# Patient Record
Sex: MALE | Race: WHITE | NOT HISPANIC OR LATINO | Employment: FULL TIME | URBAN - METROPOLITAN AREA
[De-identification: names, ages, dates, MRNs, and addresses within clinical notes are randomized per-mention and may not be internally consistent; named-entity substitution may affect disease eponyms.]

---

## 2017-10-15 ENCOUNTER — HOSPITAL ENCOUNTER (EMERGENCY)
Facility: HOSPITAL | Age: 57
Discharge: HOME | End: 2017-10-15
Attending: EMERGENCY MEDICINE | Admitting: EMERGENCY MEDICINE

## 2017-10-15 ENCOUNTER — APPOINTMENT (OUTPATIENT)
Dept: GENERAL RADIOLOGY | Facility: HOSPITAL | Age: 57
End: 2017-10-15

## 2017-10-15 VITALS
RESPIRATION RATE: 16 BRPM | OXYGEN SATURATION: 95 % | TEMPERATURE: 98.2 F | WEIGHT: 230 LBS | HEART RATE: 75 BPM | BODY MASS INDEX: 34.07 KG/M2 | SYSTOLIC BLOOD PRESSURE: 135 MMHG | DIASTOLIC BLOOD PRESSURE: 96 MMHG | HEIGHT: 69 IN

## 2017-10-15 DIAGNOSIS — S76.111A RUPTURE OF RIGHT QUADRICEPS MUSCLE, INITIAL ENCOUNTER: Primary | ICD-10-CM

## 2017-10-15 PROCEDURE — 99283 EMERGENCY DEPT VISIT LOW MDM: CPT

## 2017-10-15 PROCEDURE — 73560 X-RAY EXAM OF KNEE 1 OR 2: CPT

## 2017-10-15 RX ORDER — HYDROCODONE BITARTRATE AND ACETAMINOPHEN 7.5; 325 MG/1; MG/1
1 TABLET ORAL ONCE
Status: COMPLETED | OUTPATIENT
Start: 2017-10-15 | End: 2017-10-15

## 2017-10-15 RX ORDER — HYDROCODONE BITARTRATE AND ACETAMINOPHEN 7.5; 325 MG/1; MG/1
1 TABLET ORAL EVERY 6 HOURS PRN
Qty: 15 TABLET | Refills: 0 | Status: SHIPPED | OUTPATIENT
Start: 2017-10-15 | End: 2017-10-19 | Stop reason: SDUPTHER

## 2017-10-15 RX ADMIN — HYDROCODONE BITARTRATE AND ACETAMINOPHEN 1 TABLET: 7.5; 325 TABLET ORAL at 23:44

## 2017-10-16 ENCOUNTER — TELEPHONE (OUTPATIENT)
Dept: ORTHOPEDIC SURGERY | Facility: CLINIC | Age: 57
End: 2017-10-16

## 2017-10-16 NOTE — TELEPHONE ENCOUNTER
Seen at [facility] ER 10/15 and says has right quad tear. Told to f/u with WAN. He only has a ride on Tuesday or Thursday this week. Please advise.

## 2017-10-16 NOTE — DISCHARGE INSTRUCTIONS
Return to the ER with any further concerns or should your condition change or worsen.  Ice, elevate, crutches.

## 2017-10-16 NOTE — ED PROVIDER NOTES
The patient presents complaining of R thigh pain s/p injury with a refrigerator. Pt states that he was carrying a fridge up the stairs and that it fell onto his R thigh. Pt also c/o lowered ROM of his R leg, but states that he has been able to weight bear.     Physical Exam:   Back/extremities: soft tissue swelling in the suprapatellar area. Quadricepts tendon feels soft in this area. There is some weak extensor mechanism and no obvious defect.     Radiology:   XR R knee: soft tissue swelling, no fracture.     Plan:   Will d/c the pt with an immobilizer, meds for pain, and a f/u with an orthopedist. Advised ice for swelling.    I supervised care provided by the midlevel provider.  We have discussed this patient's history, physical exam, and treatment plan.  I have reviewed the note and personally saw and examined the patient and agree with the plan of care.    Documentation assistance provided by souleymane Alonso.  Information recorded by the scribe was done at my direction and has been verified and validated by me.           Fareed Alonso  10/15/17 7776       Mike Grover MD  10/16/17 1850

## 2017-10-16 NOTE — ED NOTES
"Pt reports he is unable to put weight on right leg. Pt states \"when I do my knee tries to give out me.\"     Belgica Villanueva RN  10/15/17 2123    "

## 2017-10-16 NOTE — ED PROVIDER NOTES
" EMERGENCY DEPARTMENT ENCOUNTER    CHIEF COMPLAINT  Chief Complaint: Right knee pain  History given by: Patient  History limited by: Nothing  Room Number: 03/03  PMD: No Known Provider      HPI:  Pt is a 57 y.o. male who presents complaining of pain in his right leg that began after a refrigerator fell on his right leg while he was trying to move it up stairs earlier today. He says that his right leg feels swollen and he cannot raise his leg. Any movement exacerbates his pain.     Duration:  Several hours  Onset: sudden  Timing: constant  Location: Right knee  Radiation: none  Quality: \"pain\"  Intensity/Severity: moderate  Progression: unchanged  Associated Symptoms: none  Aggravating Factors: none  Alleviating Factors: positional rest  Previous Episodes: none  Treatment before arrival: none    PAST MEDICAL HISTORY  Active Ambulatory Problems     Diagnosis Date Noted   • No Active Ambulatory Problems     Resolved Ambulatory Problems     Diagnosis Date Noted   • No Resolved Ambulatory Problems     No Additional Past Medical History       PAST SURGICAL HISTORY  History reviewed. No pertinent surgical history.    FAMILY HISTORY  History reviewed. No pertinent family history.    SOCIAL HISTORY  Social History     Social History   • Marital status:      Spouse name: N/A   • Number of children: N/A   • Years of education: N/A     Occupational History   • Not on file.     Social History Main Topics   • Smoking status: Not on file   • Smokeless tobacco: Not on file   • Alcohol use Not on file   • Drug use: Not on file   • Sexual activity: Not on file     Other Topics Concern   • Not on file     Social History Narrative   • No narrative on file       ALLERGIES  Review of patient's allergies indicates no known allergies.    REVIEW OF SYSTEMS  Review of Systems   Constitutional: Negative for activity change, appetite change and fever.   HENT: Negative for congestion and sore throat.    Eyes: Negative.    Respiratory: " Negative for cough and shortness of breath.    Cardiovascular: Negative for chest pain and leg swelling.   Gastrointestinal: Negative for abdominal pain, diarrhea and vomiting.   Endocrine: Negative.    Genitourinary: Negative for decreased urine volume and dysuria.   Musculoskeletal: Positive for myalgias (right knee). Negative for neck pain.   Skin: Negative for rash and wound.   Allergic/Immunologic: Negative.    Neurological: Negative for weakness, numbness and headaches.   Hematological: Negative.    Psychiatric/Behavioral: Negative.    All other systems reviewed and are negative.      PHYSICAL EXAM  ED Triage Vitals   Temp Heart Rate Resp BP SpO2   10/15/17 1803 10/15/17 1803 10/15/17 1803 10/15/17 1806 10/15/17 1803   98.2 °F (36.8 °C) 63 16 158/95 92 %      Temp src Heart Rate Source Patient Position BP Location FiO2 (%)   10/15/17 1803 -- -- -- --   Tympanic           Physical Exam   Constitutional: No distress.   HENT:   Head: Normocephalic and atraumatic.   Eyes: EOM are normal.   Neck: Normal range of motion.   Cardiovascular: Normal heart sounds.    Pulses:       Dorsalis pedis pulses are 2+ on the right side, and 2+ on the left side.   Pulmonary/Chest: No respiratory distress.   Abdominal: There is no tenderness.   Musculoskeletal: He exhibits no edema.   Soft tissue swelling, right knee. Pt unable to perform a SLR. There was an obvious deficit above the patella with his quadricep.    Neurological: He is alert.   Skin: Skin is warm and dry.   Nursing note and vitals reviewed.        RADIOLOGY  XR Knee 1 or 2 View Right   Preliminary Result   1.  No acute fracture or dislocation. Soft tissue swelling around the   knee.   2.  Calcifications above the knee joint may be soft tissue   calcifications or loose bodies.               I ordered the above noted radiological studies. Interpreted by radiologist.  Reviewed by me in PACS.       PROCEDURES  Procedures      PROGRESS AND CONSULTS  ED Course      1806  Ordered XR right knee for further evaluation.     2223  Informed Pt that his XR shows no acute fracture, but that he likely suffered a tear in his quadricep tendon. Informed Pt that I will get him pain medication and order a knee immobilizer.     2225  Placed call to Ortho for further evaluation. Ordered a knee immobilizer.     2240  Discussed case with Dr. Lantigua (Ortho) who suggests that Pt be discharged and instructed him to follow up with him in three days.     2305  Rechecked the patient and he is resting comfortably. Discussed plan to discharge the patient home and recommended follow up with Dr. Lantigua for an apt in three days. Instructed Pt to ice and elevate leg until then. Patient agrees with the plan and all questions were addressed.     2308  Ordered Norco for pain.     Latest vital signs   BP- 135/96 HR- 75 Temp- 98.2 °F (36.8 °C) (Tympanic) O2 sat- 95%    MEDICAL DECISION MAKING  Results were reviewed/discussed with the patient and they were also made aware of online access. Pt also made aware that some labs, such as cultures, will not be resulted during ER visit and follow up with PMD is necessary.     MDM  Number of Diagnoses or Management Options     Amount and/or Complexity of Data Reviewed  Tests in the radiology section of CPT®: ordered and reviewed (XR leg shows nothing acute. )  Discuss the patient with other providers: yes (Dr. Lantigua (Ortho))    Patient Progress  Patient progress: stable         DIAGNOSIS  Final diagnoses:   Rupture of right quadriceps muscle, initial encounter       DISPOSITION  DISCHARGE    Patient discharged in stable condition.    Reviewed implications of results, diagnosis, meds, responsibility to follow up, warning signs and symptoms of possible worsening, potential complications..    Patient/Family voiced understanding of above instructions.    Discussed plan for discharge, as there is no emergent indication for admission.  Pt/family is agreeable and  understands need for follow up and repeat testing.  Pt is aware that discharge does not mean that nothing is wrong but it indicates no emergency is present that requires admission and they must continue care with follow-up as given below or physician of their choice.     FOLLOW-UP  Terrence Lantigua MD  [facility] [id number] [phone]    Schedule an appointment as soon as possible for a visit  For further evaluation and treatment         Medication List      New Prescriptions          HYDROcodone-acetaminophen 7.5-325 MG per tablet   Commonly known as:  NORCO   Take 1 tablet by mouth Every 6 (Six) Hours As Needed for Moderate Pain .               Latest Documented Vital Signs:  As of 2:43 AM  BP- 135/96 HR- 75 Temp- 98.2 °F (36.8 °C) (Tympanic) O2 sat- 95%    --  Documentation assistance provided by souleymane Bergman for Scott Mckeon PA-C.  Information recorded by the scribe was done at my direction and has been verified and validated by me.     Gisela Bergman  10/15/17 6739       Xavier Longoria  10/16/17 0034       ZITA Stern III  10/16/17 0249

## 2017-10-19 ENCOUNTER — OFFICE VISIT (OUTPATIENT)
Dept: ORTHOPEDIC SURGERY | Facility: CLINIC | Age: 57
End: 2017-10-19

## 2017-10-19 ENCOUNTER — HOSPITAL ENCOUNTER (OUTPATIENT)
Dept: MRI IMAGING | Facility: HOSPITAL | Age: 57
Discharge: HOME | End: 2017-10-19
Attending: ORTHOPAEDIC SURGERY | Admitting: ORTHOPAEDIC SURGERY

## 2017-10-19 VITALS
TEMPERATURE: 97.6 F | DIASTOLIC BLOOD PRESSURE: 88 MMHG | HEIGHT: 69 IN | BODY MASS INDEX: 35.55 KG/M2 | SYSTOLIC BLOOD PRESSURE: 120 MMHG | WEIGHT: 240 LBS

## 2017-10-19 DIAGNOSIS — S76.111A RUPTURE QUADRICEPS TENDON, RIGHT, INITIAL ENCOUNTER: Primary | ICD-10-CM

## 2017-10-19 DIAGNOSIS — M23.91 INTERNAL DERANGEMENT OF KNEE JOINT, RIGHT: ICD-10-CM

## 2017-10-19 DIAGNOSIS — S76.111A RUPTURE QUADRICEPS TENDON, RIGHT, INITIAL ENCOUNTER: ICD-10-CM

## 2017-10-19 PROCEDURE — 73721 MRI JNT OF LWR EXTRE W/O DYE: CPT

## 2017-10-19 PROCEDURE — 99204 OFFICE O/P NEW MOD 45 MIN: CPT | Performed by: ORTHOPAEDIC SURGERY

## 2017-10-19 RX ORDER — CEFAZOLIN SODIUM 2 G/100ML
2 INJECTION, SOLUTION INTRAVENOUS ONCE
Status: CANCELLED | OUTPATIENT
Start: 2017-10-23 | End: 2017-10-19

## 2017-10-19 RX ORDER — HYDROCODONE BITARTRATE AND ACETAMINOPHEN 7.5; 325 MG/1; MG/1
TABLET ORAL
Qty: 60 TABLET | Refills: 0 | Status: SHIPPED | OUTPATIENT
Start: 2017-10-19 | End: 2017-11-02 | Stop reason: SDUPTHER

## 2017-10-19 RX ORDER — CEPHALEXIN 500 MG/1
CAPSULE ORAL
Qty: 4 CAPSULE | Refills: 0 | Status: SHIPPED | OUTPATIENT
Start: 2017-10-19 | End: 2017-12-04

## 2017-10-19 NOTE — PROGRESS NOTES
Patient:  Jose Manuel Vasquez is a 57 y.o. male    Chief Complaint/ Reason for Visit:    Chief Complaint   Patient presents with   • Right Knee - Establish Care, Pain, Joint Swelling       HPI:  This pleasant gentleman presents today, accompanied by his wife Kimberlee, requesting evaluation of a recent injury to his right knee and thigh.  He says that this past Sunday he was helping his son move a double wide risk for greater that was very heavy and it just started ranging and his left foot slipped.  The refrigerator, which was on a yahir, came down onto his right knee and thigh.  He had the immediate onset of severe sharp pain and swelling in his right knee with pain radiating up the lateral aspect of his right thigh.    He was evaluated in the emergency department at [facility].  I spoke with the emergency department provider that night who said that he felt a defect in his quadriceps tendon and it was fairly certain the patient had completely ruptured his right quadriceps tendon.  The patient was placed in a knee immobilizer and fitted with crutches.      The patient is now walking with one crutch and using the knee immobilizer.  He said that he doesn't have much pain when he is up moving around, but if he tries to bend or extend the knee has moderate pain of an aching nature.  He felt like he had a severe, crushing pain at the time of the injury.  Anything that involves walking or standing, especially standing in the shower without the brace, increases his pain.  He does not have any calf pain, chest pain, or shortness of breath.  He has no history of serious injury to his right knee previously at that he can recall.  Unfortunately, he was otherwise uninjured and this event.    PMH:    Past Medical History:   Diagnosis Date   • Fracture of wrist     Left       PSH:  History reviewed. No pertinent surgical history.    Social Hx:    Social History     Social History   • Marital status:      Spouse name: N/A   • Number of  children: N/A   • Years of education: N/A     Occupational History   • Not on file.     Social History Main Topics   • Smoking status: Never Smoker   • Smokeless tobacco: Never Used   • Alcohol use Yes   • Drug use: No   • Sexual activity: Defer     Other Topics Concern   • Not on file     Social History Narrative       Family Hx:    Family History   Problem Relation Age of Onset   • No Known Problems Mother    • No Known Problems Father    • No Known Problems Sister    • Heart disease Brother    • Diabetes Brother    • No Known Problems Maternal Aunt    • No Known Problems Maternal Uncle    • No Known Problems Paternal Aunt    • No Known Problems Paternal Uncle    • No Known Problems Maternal Grandmother    • No Known Problems Maternal Grandfather    • No Known Problems Paternal Grandmother    • No Known Problems Paternal Grandfather    • Anesthesia problems Neg Hx    • Broken bones Neg Hx    • Cancer Neg Hx    • Clotting disorder Neg Hx    • Collagen disease Neg Hx    • Dislocations Neg Hx    • Osteoporosis Neg Hx    • Rheumatologic disease Neg Hx    • Scoliosis Neg Hx    • Severe sprains Neg Hx        Meds:    Current Outpatient Prescriptions:   •  HYDROcodone-acetaminophen (NORCO) 7.5-325 MG per tablet, May take 1 or 2 tablets by mouth every 4 hours as needed for pain after surgery., Disp: 60 tablet, Rfl: 0  •  apixaban (ELIQUIS) 2.5 MG tablet tablet, Take 1 tablet by mouth every 12 hours beginning no sooner than 12 hours after the end of your surgery., Disp: 28 tablet, Rfl: 0  •  cephalexin (KEFLEX) 500 MG capsule, Take 1 tablet every 6 hours for only 4 doses after surgery., Disp: 4 capsule, Rfl: 0    Allergies:  No Known Allergies    ROS:  Review of Systems   Musculoskeletal: Positive for arthralgias, gait problem, joint swelling and myalgias.   All other systems reviewed and are negative.      Vitals:    10/19/17 0918   BP: 120/88   BP Location: Left arm   Patient Position: Sitting   Cuff Size: Adult   Temp:  "97.6 °F (36.4 °C)   TempSrc: Temporal Artery    Weight: 240 lb (109 kg)   Height: 69\" (175.3 cm)       Physical Exam   Constitutional: He is oriented to person, place, and time. He appears well-developed and well-nourished. He is cooperative.   HENT:   Head: Normocephalic and atraumatic.   Mouth/Throat: Oropharynx is clear and moist.   Eyes: Conjunctivae and EOM are normal. Pupils are equal, round, and reactive to light. No scleral icterus.   Neck: No JVD present. No tracheal deviation present.   Cardiovascular: Normal rate, regular rhythm, normal heart sounds and intact distal pulses.    No murmur heard.  Pulses:       Dorsalis pedis pulses are 2+ on the right side, and 2+ on the left side.        Posterior tibial pulses are 2+ on the right side, and 2+ on the left side.   Pulmonary/Chest: Effort normal and breath sounds normal. No respiratory distress.   Musculoskeletal:        Right knee: He exhibits decreased range of motion, swelling and effusion. He exhibits no laceration, no erythema and normal alignment.   Right knee: The right knee has moderate swelling and effusion, which I suspect is a hemarthrosis.  He has tenderness globally especially in the suprapatellar pouch and the suprapatellar region.  Interestingly enough, he can now do an active straight leg raise.  However, he does have discomfort against resisted extension of his right knee, and does definitely have a strength deficit to extension.  I do believe that I feel a defect in the suprapatellar region consistent with a near complete avulsion of his quadriceps tendon.  His right calf is soft and nontender with no venous cord.  I could not accurately assess stability due to the effusion, pain, and tenderness.   Neurological: He is alert and oriented to person, place, and time.   Skin: Skin is warm and dry. No rash noted. No cyanosis or erythema. Nails show no clubbing.   Psychiatric: He has a normal mood and affect. His speech is normal and behavior is " normal. Judgment and thought content normal.   Nursing note and vitals reviewed.              Radiology: X-rays: AP and lateral the patient's right knee were reviewed on the [facility] PACS system from his ER visit on Sunday.  The patient has significant soft tissue swelling and effusion.  He does not seem to have neftaly patella infra.  However, there is a calcific body in the suprapatellar effusion that I suspect may actually be an avulsed osteophyte that has traveled with much of his quadriceps tendon that I suspect to be avulsed.  Comparison images were not available.          Assessment:  1. Rupture quadriceps tendon, right, initial encounter    - MRI Knee Right Without Contrast; Future  - ceFAZolin (ANCEF) 2 g in sodium chloride 0.9 % 100 mL IVPB; Infuse 2 g into a venous catheter 1 (One) Time.  - Case Request; Standing  - Case Request    2. Internal derangement of knee joint, right    - MRI Knee Right Without Contrast; Future  - ceFAZolin (ANCEF) 2 g in sodium chloride 0.9 % 100 mL IVPB; Infuse 2 g into a venous catheter 1 (One) Time.  - Case Request; Standing  - Case Request        Plan:  I had a long discussion with the patient and his wife regarding his current circumstances.  I explained that I was surprisingly good do an active straight leg raise as his history knee exam certainly suggested a quadriceps tendon rupture.  It may be that he has sufficient strength in his tensor fascia a lot of that he is actually able to use this as an active extensor now that his acute pain has subsided.  I certainly do believe I feel a defect when I palpate the suprapatellar region.    That being said, I think we need an MRI to define exactly what is going on.  I will be surprised if he does not have a near complete quadriceps tendon rupture.  We also need to see what else may be going on inside his knee.    I suspect he has a surgical injury and therefore I think we should go ahead and schedule him for quadriceps tendon  repair in anticipation of this finding.  Of course, if this is not found to be the case, we can always cancel the surgery.  The patient and his wife are in agreement.     I discussed everything with the patient and his wife.  We reviewed the options, both surgical and nonsurgical, the potential outcomes good and bad of these options, and the pluses and minuses, risks and benefits thereof.  I reviewed that surgery had risks, and that surgery had no guarantees.  I advised the patient that nonsurgical management of this injury may not result in a good outcome.  I explained the elements of the injury that made me concerned that nonsurgical management was probably not the best option, though again, I emphasized that there was no guarantee of a good outcome with surgical treatment.  We reviewed that the risks of surgery include but are not limited to bleeding, infection, injury to nerves, blood vessels, tendons, ligaments, or other soft tissue structures, skin wound problems or skin breakdown, permanent pain, permanent stiffness, permanent limping, and/or permanent swelling, the need for future surgical procedures, blood clots, arthritis, pulmonary embolism, pneumonia, stroke, heart attack, amputation, and even death due to these or other complications.  I advised them that the patient would likely never regain his full, preinjury range of motion, and that he may always have some degree of discomfort, pain, and/or stiffness.  He may have some degree of permanent limping and/or swelling.  They said they understand.    We discussed the particulars of surgical treatment of this problem and the various options in that respect.  I went over the details of transosseous tunnel technique for repair of the quadriceps tendon.  We also reviewed, in detail, the expected postoperative activity recommendations, restrictions, and requirements, and reviewed the recovery from the standpoint restrictions and expectations.  All of their  questions were answered to their satisfaction.  The patient said he said he understood the options and agrees with proceeding with surgery scheduling understanding that if his MRI does not reveal what clinically I believed to be a quadriceps tendon rupture that is near-complete that we can always cancel the surgery.    Narcotic counseling was performed in the usual fashion, and I emphasized the risks of narcotic use and the dangers. We discussed the potential for dependency and addiction, and we discussed things to avoid when taking these medications including specific activities to avoid and other sedating substances including alcohol or other medications.     A [facility] report was checked, and the patient confirmed that she had reviewed the house bill 1 warnings on the exam all. Appropriate narcotic pain medication was issued. The patient was also prescribed an appropriate prophylactic postoperative antibiotic.        Orders Placed This Encounter   Procedures   • MRI Knee Right Without Contrast     Standing Status:   Future     Standing Expiration Date:   10/19/2018     Order Specific Question:   Reason for Exam:     Answer:   I suspect this patient has a high-grade partial/near-complete rupture of his right quadriceps tendon.  I need to evaluate his injury, and also evaluate the right knee for associated surgical internal derangements.   • Follow anesthesia standing orders.   • Obtain informed consent     Order Specific Question:   Informed Consent Given For     Answer:   Quadriceps tendon repair     Order Specific Question:   Laterality     Answer:   Right   • Clorhexidine skin prep     Educate and review protocol with patient.   • Provide instructions to patient regarding NPO status

## 2017-10-20 ENCOUNTER — APPOINTMENT (OUTPATIENT)
Dept: PREADMISSION TESTING | Facility: HOSPITAL | Age: 57
End: 2017-10-20

## 2017-10-20 ENCOUNTER — TELEPHONE (OUTPATIENT)
Dept: ORTHOPEDIC SURGERY | Facility: CLINIC | Age: 57
End: 2017-10-20

## 2017-10-20 VITALS
DIASTOLIC BLOOD PRESSURE: 93 MMHG | WEIGHT: 230 LBS | SYSTOLIC BLOOD PRESSURE: 170 MMHG | HEIGHT: 69 IN | TEMPERATURE: 98.3 F | RESPIRATION RATE: 20 BRPM | BODY MASS INDEX: 34.07 KG/M2 | HEART RATE: 62 BPM | OXYGEN SATURATION: 100 %

## 2017-10-20 LAB
DEPRECATED RDW RBC AUTO: 43.1 FL (ref 37–54)
ERYTHROCYTE [DISTWIDTH] IN BLOOD BY AUTOMATED COUNT: 12.4 % (ref 11.5–14.5)
HCT VFR BLD AUTO: 43 % (ref 40.4–52.2)
HGB BLD-MCNC: 14.6 G/DL (ref 13.7–17.6)
MCH RBC QN AUTO: 32.2 PG (ref 27–32.7)
MCHC RBC AUTO-ENTMCNC: 34 G/DL (ref 32.6–36.4)
MCV RBC AUTO: 94.7 FL (ref 79.8–96.2)
PLATELET # BLD AUTO: 261 10*3/MM3 (ref 140–500)
PMV BLD AUTO: 10.5 FL (ref 6–12)
RBC # BLD AUTO: 4.54 10*6/MM3 (ref 4.6–6)
WBC NRBC COR # BLD: 8.13 10*3/MM3 (ref 4.5–10.7)

## 2017-10-20 PROCEDURE — 36415 COLL VENOUS BLD VENIPUNCTURE: CPT

## 2017-10-20 PROCEDURE — 85027 COMPLETE CBC AUTOMATED: CPT | Performed by: ORTHOPAEDIC SURGERY

## 2017-10-20 NOTE — DISCHARGE INSTRUCTIONS
Take the following medications the morning of surgery with a small sip of water:  NONE    ARRIVAL TIME 1030 TO [facility]        General Instructions:  • Do not eat solid food after midnight the night before surgery.  • You may drink clear liquids day of surgery but must stop at least one hour before your hospital arrival time.  • It is beneficial for you to have a clear drink that contains carbohydrates the day of surgery.  We suggest a 12 to 20 ounce bottle of Gatorade or Powerade for non-diabetic patients or a 12 to 20 ounce bottle of G2 or Powerade Zero for diabetic patients. (Pediatric patients, are not advised to drink a 12 to 20 ounce carbohydrate drink)    Clear liquids are liquids you can see through.  Nothing red in color.     Plain water                               Sports drinks  Sodas                                   Gelatin (Jell-O)  Fruit juices without pulp such as white grape juice and apple juice  Popsicles that contain no fruit or yogurt  Tea or coffee (no cream or milk added)  Gatorade / Powerade  G2 / Powerade Zero    • Infants may have breast milk up to four hours before surgery.  • Infants drinking formula may drink formula up to six hours before surgery.   • Patients who avoid smoking, chewing tobacco and alcohol for 4 weeks prior to surgery have a reduced risk of post-operative complications.  Quit smoking as many days before surgery as you can.  • Do not smoke, use chewing tobacco or drink alcohol the day of surgery.   • If applicable bring your C-PAP/ BI-PAP machine.  • Bring any papers given to you in the doctor’s office.  • Wear clean comfortable clothes and socks.  • Do not wear contact lenses or make-up.  Bring a case for your glasses.   • Bring crutches or walker if applicable.  • Remove all piercings.  Leave jewelry and any other valuables at home.  • The Pre-Admission Testing nurse will instruct you to bring medications if unable to obtain an accurate list in  Pre-Admission Testing.            Preventing a Surgical Site Infection:  • For 2 to 3 days before surgery, avoid shaving with a razor because the razor can irritate skin and make it easier to develop an infection.  • The night prior to surgery sleep in a clean bed with clean clothing.  Do not allow pets to sleep with you.  • Shower on the morning of surgery using a fresh bar of anti-bacterial soap (such as Dial) and clean washcloth.  Dry with a clean towel and dress in clean clothing.  • Ask your surgeon if you will be receiving antibiotics prior to surgery.  • Make sure you, your family, and all healthcare providers clean their hands with soap and water or an alcohol based hand  before caring for you or your wound.    Day of surgery:  Upon arrival, a Pre-op nurse and Anesthesiologist will review your health history, obtain vital signs, and answer questions you may have.  The only belongings needed at this time will be your home medications and if applicable your C-PAP/BI-PAP machine.  If you are staying overnight your family can leave the rest of your belongings in the car and bring them to your room later.  A Pre-op nurse will start an IV and you may receive medication in preparation for surgery, including something to help you relax.  Your family will be able to see you in the Pre-op area.  While you are in surgery your family should notify the waiting room  if they leave the waiting room area and provide a contact phone number.    Please be aware that surgery does come with discomfort.  We want to make every effort to control your discomfort so please discuss any uncontrolled symptoms with your nurse.   Your doctor will most likely have prescribed pain medications.      If you are going home after surgery you will receive individualized written care instructions before being discharged.  A responsible adult must drive you to and from the hospital on the day of your surgery and stay with you  for 24 hours.    If you are staying overnight following surgery, you will be transported to your hospital room following the recovery period.  [facility] has all private rooms.    If you have any questions please call Pre-Admission Testing at [phone].  Deductibles and co-payments are collected on the day of service. Please be prepared to pay the required co-pay, deductible or deposit on the day of service as defined by your plan.

## 2017-10-23 ENCOUNTER — TELEPHONE (OUTPATIENT)
Dept: ORTHOPEDIC SURGERY | Facility: CLINIC | Age: 57
End: 2017-10-23

## 2017-10-23 ENCOUNTER — HOSPITAL ENCOUNTER (OUTPATIENT)
Facility: HOSPITAL | Age: 57
Setting detail: HOSPITAL OUTPATIENT SURGERY
Discharge: HOME | End: 2017-10-23
Attending: ORTHOPAEDIC SURGERY | Admitting: ORTHOPAEDIC SURGERY

## 2017-10-23 ENCOUNTER — ANESTHESIA EVENT (OUTPATIENT)
Dept: PERIOP | Facility: HOSPITAL | Age: 57
End: 2017-10-23

## 2017-10-23 ENCOUNTER — ANESTHESIA (OUTPATIENT)
Dept: PERIOP | Facility: HOSPITAL | Age: 57
End: 2017-10-23

## 2017-10-23 VITALS
DIASTOLIC BLOOD PRESSURE: 91 MMHG | OXYGEN SATURATION: 95 % | SYSTOLIC BLOOD PRESSURE: 132 MMHG | RESPIRATION RATE: 16 BRPM | HEART RATE: 90 BPM | TEMPERATURE: 97.7 F

## 2017-10-23 DIAGNOSIS — S76.111A RUPTURE QUADRICEPS TENDON, RIGHT, INITIAL ENCOUNTER: ICD-10-CM

## 2017-10-23 DIAGNOSIS — M23.91 INTERNAL DERANGEMENT OF KNEE JOINT, RIGHT: ICD-10-CM

## 2017-10-23 PROBLEM — S83.241A TEAR OF MEDIAL MENISCUS OF RIGHT KNEE, CURRENT: Status: ACTIVE | Noted: 2017-10-23

## 2017-10-23 PROCEDURE — C1713 ANCHOR/SCREW BN/BN,TIS/BN: HCPCS | Performed by: ORTHOPAEDIC SURGERY

## 2017-10-23 PROCEDURE — 25010000002 DEXAMETHASONE PER 1 MG: Performed by: NURSE ANESTHETIST, CERTIFIED REGISTERED

## 2017-10-23 PROCEDURE — 25010000002 FENTANYL CITRATE (PF) 100 MCG/2ML SOLUTION: Performed by: ANESTHESIOLOGY

## 2017-10-23 PROCEDURE — 25010000002 ONDANSETRON PER 1 MG: Performed by: NURSE ANESTHETIST, CERTIFIED REGISTERED

## 2017-10-23 PROCEDURE — 25010000002 KETOROLAC TROMETHAMINE PER 15 MG: Performed by: NURSE ANESTHETIST, CERTIFIED REGISTERED

## 2017-10-23 PROCEDURE — 25010000002 PROPOFOL 10 MG/ML EMULSION: Performed by: NURSE ANESTHETIST, CERTIFIED REGISTERED

## 2017-10-23 PROCEDURE — 25010000002 FENTANYL CITRATE (PF) 100 MCG/2ML SOLUTION: Performed by: NURSE ANESTHETIST, CERTIFIED REGISTERED

## 2017-10-23 PROCEDURE — 25010000002 MIDAZOLAM PER 1 MG: Performed by: ANESTHESIOLOGY

## 2017-10-23 PROCEDURE — 25010000002 HYDROMORPHONE PER 4 MG: Performed by: NURSE ANESTHETIST, CERTIFIED REGISTERED

## 2017-10-23 PROCEDURE — 27385 REPAIR OF THIGH MUSCLE: CPT | Performed by: ORTHOPAEDIC SURGERY

## 2017-10-23 PROCEDURE — 25010000003 CEFAZOLIN IN DEXTROSE 2-4 GM/100ML-% SOLUTION: Performed by: ORTHOPAEDIC SURGERY

## 2017-10-23 PROCEDURE — 25010000002 HYDROMORPHONE PER 4 MG

## 2017-10-23 PROCEDURE — 25010000002 ROPIVACAINE PER 1 MG: Performed by: ANESTHESIOLOGY

## 2017-10-23 RX ORDER — HYDRALAZINE HYDROCHLORIDE 20 MG/ML
5 INJECTION INTRAMUSCULAR; INTRAVENOUS
Status: DISCONTINUED | OUTPATIENT
Start: 2017-10-23 | End: 2017-10-23 | Stop reason: HOSPADM

## 2017-10-23 RX ORDER — PROPOFOL 10 MG/ML
VIAL (ML) INTRAVENOUS AS NEEDED
Status: DISCONTINUED | OUTPATIENT
Start: 2017-10-23 | End: 2017-10-23 | Stop reason: SURG

## 2017-10-23 RX ORDER — LABETALOL HYDROCHLORIDE 5 MG/ML
INJECTION, SOLUTION INTRAVENOUS AS NEEDED
Status: DISCONTINUED | OUTPATIENT
Start: 2017-10-23 | End: 2017-10-23 | Stop reason: SURG

## 2017-10-23 RX ORDER — FENTANYL CITRATE 50 UG/ML
50 INJECTION, SOLUTION INTRAMUSCULAR; INTRAVENOUS
Status: DISCONTINUED | OUTPATIENT
Start: 2017-10-23 | End: 2017-10-23 | Stop reason: HOSPADM

## 2017-10-23 RX ORDER — KETOROLAC TROMETHAMINE 30 MG/ML
INJECTION, SOLUTION INTRAMUSCULAR; INTRAVENOUS AS NEEDED
Status: DISCONTINUED | OUTPATIENT
Start: 2017-10-23 | End: 2017-10-23 | Stop reason: SURG

## 2017-10-23 RX ORDER — MIDAZOLAM HYDROCHLORIDE 1 MG/ML
2 INJECTION INTRAMUSCULAR; INTRAVENOUS
Status: DISCONTINUED | OUTPATIENT
Start: 2017-10-23 | End: 2017-10-23 | Stop reason: HOSPADM

## 2017-10-23 RX ORDER — ACETAMINOPHEN 650 MG
TABLET, EXTENDED RELEASE ORAL AS NEEDED
Status: DISCONTINUED | OUTPATIENT
Start: 2017-10-23 | End: 2017-10-23 | Stop reason: HOSPADM

## 2017-10-23 RX ORDER — MIDAZOLAM HYDROCHLORIDE 1 MG/ML
1 INJECTION INTRAMUSCULAR; INTRAVENOUS
Status: DISCONTINUED | OUTPATIENT
Start: 2017-10-23 | End: 2017-10-23 | Stop reason: HOSPADM

## 2017-10-23 RX ORDER — ACETAMINOPHEN 325 MG/1
650 TABLET ORAL ONCE AS NEEDED
Status: DISCONTINUED | OUTPATIENT
Start: 2017-10-23 | End: 2017-10-23 | Stop reason: HOSPADM

## 2017-10-23 RX ORDER — SODIUM CHLORIDE, SODIUM LACTATE, POTASSIUM CHLORIDE, CALCIUM CHLORIDE 600; 310; 30; 20 MG/100ML; MG/100ML; MG/100ML; MG/100ML
9 INJECTION, SOLUTION INTRAVENOUS CONTINUOUS
Status: DISCONTINUED | OUTPATIENT
Start: 2017-10-23 | End: 2017-10-23 | Stop reason: HOSPADM

## 2017-10-23 RX ORDER — PROMETHAZINE HYDROCHLORIDE 25 MG/1
25 TABLET ORAL ONCE AS NEEDED
Status: DISCONTINUED | OUTPATIENT
Start: 2017-10-23 | End: 2017-10-23 | Stop reason: HOSPADM

## 2017-10-23 RX ORDER — ACETAMINOPHEN 325 MG/1
650 TABLET ORAL ONCE
Status: COMPLETED | OUTPATIENT
Start: 2017-10-23 | End: 2017-10-23

## 2017-10-23 RX ORDER — PROMETHAZINE HYDROCHLORIDE 25 MG/1
25 SUPPOSITORY RECTAL ONCE AS NEEDED
Status: DISCONTINUED | OUTPATIENT
Start: 2017-10-23 | End: 2017-10-23 | Stop reason: HOSPADM

## 2017-10-23 RX ORDER — OXYCODONE HYDROCHLORIDE AND ACETAMINOPHEN 5; 325 MG/1; MG/1
TABLET ORAL
Status: COMPLETED
Start: 2017-10-23 | End: 2017-10-23

## 2017-10-23 RX ORDER — OXYCODONE HYDROCHLORIDE AND ACETAMINOPHEN 5; 325 MG/1; MG/1
1 TABLET ORAL ONCE AS NEEDED
Status: DISCONTINUED | OUTPATIENT
Start: 2017-10-23 | End: 2017-10-23 | Stop reason: HOSPADM

## 2017-10-23 RX ORDER — LIDOCAINE HYDROCHLORIDE 20 MG/ML
INJECTION, SOLUTION INFILTRATION; PERINEURAL AS NEEDED
Status: DISCONTINUED | OUTPATIENT
Start: 2017-10-23 | End: 2017-10-23 | Stop reason: SURG

## 2017-10-23 RX ORDER — HYDROCODONE BITARTRATE AND ACETAMINOPHEN 7.5; 325 MG/1; MG/1
1 TABLET ORAL ONCE AS NEEDED
Status: CANCELLED | OUTPATIENT
Start: 2017-10-23 | End: 2017-11-02

## 2017-10-23 RX ORDER — PROMETHAZINE HYDROCHLORIDE 25 MG/ML
6.25 INJECTION, SOLUTION INTRAMUSCULAR; INTRAVENOUS ONCE AS NEEDED
Status: DISCONTINUED | OUTPATIENT
Start: 2017-10-23 | End: 2017-10-23 | Stop reason: HOSPADM

## 2017-10-23 RX ORDER — NALOXONE HCL 0.4 MG/ML
0.4 VIAL (ML) INJECTION AS NEEDED
Status: DISCONTINUED | OUTPATIENT
Start: 2017-10-23 | End: 2017-10-23 | Stop reason: HOSPADM

## 2017-10-23 RX ORDER — NALBUPHINE HCL 10 MG/ML
2 AMPUL (ML) INJECTION EVERY 4 HOURS PRN
Status: DISCONTINUED | OUTPATIENT
Start: 2017-10-23 | End: 2017-10-23 | Stop reason: HOSPADM

## 2017-10-23 RX ORDER — FENTANYL CITRATE 50 UG/ML
INJECTION, SOLUTION INTRAMUSCULAR; INTRAVENOUS AS NEEDED
Status: DISCONTINUED | OUTPATIENT
Start: 2017-10-23 | End: 2017-10-23 | Stop reason: SURG

## 2017-10-23 RX ORDER — SODIUM CHLORIDE 0.9 % (FLUSH) 0.9 %
1-10 SYRINGE (ML) INJECTION AS NEEDED
Status: DISCONTINUED | OUTPATIENT
Start: 2017-10-23 | End: 2017-10-23 | Stop reason: HOSPADM

## 2017-10-23 RX ORDER — DIPHENHYDRAMINE HYDROCHLORIDE 50 MG/ML
12.5 INJECTION INTRAMUSCULAR; INTRAVENOUS
Status: DISCONTINUED | OUTPATIENT
Start: 2017-10-23 | End: 2017-10-23 | Stop reason: HOSPADM

## 2017-10-23 RX ORDER — NALBUPHINE HCL 10 MG/ML
10 AMPUL (ML) INJECTION EVERY 4 HOURS PRN
Status: DISCONTINUED | OUTPATIENT
Start: 2017-10-23 | End: 2017-10-23 | Stop reason: HOSPADM

## 2017-10-23 RX ORDER — ACETAMINOPHEN 650 MG/1
650 SUPPOSITORY RECTAL ONCE AS NEEDED
Status: DISCONTINUED | OUTPATIENT
Start: 2017-10-23 | End: 2017-10-23 | Stop reason: HOSPADM

## 2017-10-23 RX ORDER — ONDANSETRON 2 MG/ML
INJECTION INTRAMUSCULAR; INTRAVENOUS AS NEEDED
Status: DISCONTINUED | OUTPATIENT
Start: 2017-10-23 | End: 2017-10-23 | Stop reason: SURG

## 2017-10-23 RX ORDER — LIDOCAINE HYDROCHLORIDE 10 MG/ML
0.5 INJECTION, SOLUTION EPIDURAL; INFILTRATION; INTRACAUDAL; PERINEURAL ONCE AS NEEDED
Status: DISCONTINUED | OUTPATIENT
Start: 2017-10-23 | End: 2017-10-23 | Stop reason: HOSPADM

## 2017-10-23 RX ORDER — ROPIVACAINE HYDROCHLORIDE 5 MG/ML
INJECTION, SOLUTION EPIDURAL; INFILTRATION; PERINEURAL AS NEEDED
Status: DISCONTINUED | OUTPATIENT
Start: 2017-10-23 | End: 2017-10-23 | Stop reason: SURG

## 2017-10-23 RX ORDER — HYDROMORPHONE HYDROCHLORIDE 1 MG/ML
0.5 INJECTION, SOLUTION INTRAMUSCULAR; INTRAVENOUS; SUBCUTANEOUS
Status: DISCONTINUED | OUTPATIENT
Start: 2017-10-23 | End: 2017-10-23 | Stop reason: HOSPADM

## 2017-10-23 RX ORDER — HYDROMORPHONE HCL 110MG/55ML
PATIENT CONTROLLED ANALGESIA SYRINGE INTRAVENOUS AS NEEDED
Status: DISCONTINUED | OUTPATIENT
Start: 2017-10-23 | End: 2017-10-23 | Stop reason: SURG

## 2017-10-23 RX ORDER — LABETALOL HYDROCHLORIDE 5 MG/ML
INJECTION, SOLUTION INTRAVENOUS AS NEEDED
Status: DISCONTINUED | OUTPATIENT
Start: 2017-10-23 | End: 2017-10-23

## 2017-10-23 RX ORDER — FAMOTIDINE 10 MG/ML
20 INJECTION, SOLUTION INTRAVENOUS ONCE
Status: COMPLETED | OUTPATIENT
Start: 2017-10-23 | End: 2017-10-23

## 2017-10-23 RX ORDER — CEFAZOLIN SODIUM 2 G/100ML
2 INJECTION, SOLUTION INTRAVENOUS ONCE
Status: COMPLETED | OUTPATIENT
Start: 2017-10-23 | End: 2017-10-23

## 2017-10-23 RX ORDER — DEXAMETHASONE SODIUM PHOSPHATE 10 MG/ML
INJECTION INTRAMUSCULAR; INTRAVENOUS AS NEEDED
Status: DISCONTINUED | OUTPATIENT
Start: 2017-10-23 | End: 2017-10-23 | Stop reason: SURG

## 2017-10-23 RX ADMIN — OXYCODONE HYDROCHLORIDE AND ACETAMINOPHEN 1 TABLET: 5; 325 TABLET ORAL at 17:04

## 2017-10-23 RX ADMIN — FENTANYL CITRATE 100 MCG: 50 INJECTION INTRAMUSCULAR; INTRAVENOUS at 11:29

## 2017-10-23 RX ADMIN — SODIUM CHLORIDE, POTASSIUM CHLORIDE, SODIUM LACTATE AND CALCIUM CHLORIDE 9 ML/HR: 600; 310; 30; 20 INJECTION, SOLUTION INTRAVENOUS at 11:29

## 2017-10-23 RX ADMIN — SODIUM CHLORIDE, POTASSIUM CHLORIDE, SODIUM LACTATE AND CALCIUM CHLORIDE 9 ML/HR: 600; 310; 30; 20 INJECTION, SOLUTION INTRAVENOUS at 11:23

## 2017-10-23 RX ADMIN — FENTANYL CITRATE 50 MCG: 50 INJECTION INTRAMUSCULAR; INTRAVENOUS at 14:59

## 2017-10-23 RX ADMIN — ONDANSETRON 4 MG: 2 INJECTION INTRAMUSCULAR; INTRAVENOUS at 15:30

## 2017-10-23 RX ADMIN — MIDAZOLAM 2 MG: 1 INJECTION INTRAMUSCULAR; INTRAVENOUS at 11:30

## 2017-10-23 RX ADMIN — LIDOCAINE HYDROCHLORIDE 100 MG: 20 INJECTION, SOLUTION INFILTRATION; PERINEURAL at 13:26

## 2017-10-23 RX ADMIN — DEXAMETHASONE SODIUM PHOSPHATE 8 MG: 10 INJECTION INTRAMUSCULAR; INTRAVENOUS at 13:45

## 2017-10-23 RX ADMIN — CEFAZOLIN SODIUM 2 G: 2 INJECTION, SOLUTION INTRAVENOUS at 13:28

## 2017-10-23 RX ADMIN — FENTANYL CITRATE 50 MCG: 50 INJECTION INTRAMUSCULAR; INTRAVENOUS at 14:33

## 2017-10-23 RX ADMIN — ACETAMINOPHEN 650 MG: 325 TABLET ORAL at 11:47

## 2017-10-23 RX ADMIN — ROPIVACAINE HYDROCHLORIDE 20 ML: 5 INJECTION, SOLUTION EPIDURAL; INFILTRATION; PERINEURAL at 11:43

## 2017-10-23 RX ADMIN — HYDROMORPHONE HYDROCHLORIDE 0.5 MG: 1 INJECTION, SOLUTION INTRAMUSCULAR; INTRAVENOUS; SUBCUTANEOUS at 16:42

## 2017-10-23 RX ADMIN — KETOROLAC TROMETHAMINE 30 MG: 30 INJECTION, SOLUTION INTRAMUSCULAR; INTRAVENOUS at 15:53

## 2017-10-23 RX ADMIN — SODIUM CHLORIDE, POTASSIUM CHLORIDE, SODIUM LACTATE AND CALCIUM CHLORIDE: 600; 310; 30; 20 INJECTION, SOLUTION INTRAVENOUS at 15:05

## 2017-10-23 RX ADMIN — FAMOTIDINE 20 MG: 10 INJECTION, SOLUTION INTRAVENOUS at 11:29

## 2017-10-23 RX ADMIN — LABETALOL HYDROCHLORIDE 5 MG: 5 INJECTION, SOLUTION INTRAVENOUS at 15:10

## 2017-10-23 RX ADMIN — HYDROMORPHONE HYDROCHLORIDE 0.5 MG: 2 INJECTION, SOLUTION INTRAMUSCULAR; INTRAVENOUS; SUBCUTANEOUS at 15:26

## 2017-10-23 RX ADMIN — PROPOFOL 300 MG: 10 INJECTION, EMULSION INTRAVENOUS at 13:26

## 2017-10-23 NOTE — BRIEF OP NOTE
QUADRICEPS TENDON REPAIR  Progress Note    Jose Manuel Vasquez  10/23/2017    Pre-op Diagnosis:   Rupture quadriceps tendon, right, initial encounter [S76.111A]  Internal derangement of knee joint, right [M23.91]       Post-Op Diagnosis Codes:     * Rupture quadriceps tendon, right, initial encounter [S76.111A]     * Internal derangement of knee joint, right [M23.91]    Procedure/CPT® Codes:      Procedure(s):  RIGHT QUADRICEPS TENDON REPAIR    Surgeon(s):  Terrence Lantigua MD    Anesthesia: General with Block    Staff:   Cell Saver : Kelly Caamcho  Circulator: Abbie Vazquez RN; Brook Aguilar RN; Mounika Galo RN; Rom Raymond RN  Scrub Person: Netta Grant; Shaji Heard    Estimated Blood Loss: 60 mL    Urine Voided: * No values recorded between 10/23/2017  1:19 PM and 10/23/2017  4:22 PM *    Specimens:                None      Drains: none          Findings: The most superficial/anterior aspect of the quadriceps tendon remained intact and attached to the anterior aspect of the patella superiorly.  The vast majority of the tendon, however, was completely avulsed from the patella.    Complications: None noted intraoperatively    Tourniquet time:  90 minutes      Terrence Lantigua MD     Date: 10/23/2017  Time: 4:50 PM

## 2017-10-23 NOTE — ANESTHESIA PREPROCEDURE EVALUATION
Anesthesia Evaluation     no history of anesthetic complications:  NPO Solid Status: > 8 hours       Airway   Mallampati: II  TM distance: >3 FB  Neck ROM: full  no difficulty expected  Dental - normal exam     Pulmonary - negative pulmonary ROS and normal exam   Cardiovascular   Exercise tolerance: good (4-7 METS)    Rhythm: regular    (+) murmur,   (-) carotid bruits    PE comment: 1/6 JELLY  RUSB    Neuro/Psych- negative ROS  GI/Hepatic/Renal/Endo - negative ROS     Musculoskeletal (-) negative ROS    Abdominal  - normal exam   Substance History      OB/GYN          Other                                        Anesthesia Plan    ASA 2     general and regional   (  D/W R&B of GA including but not limited to: heart, lung, liver, kidney, neurologic problems, positioning injuries, dental damage, corneal abrasion and TMJ.  .    + FNB)  intravenous induction   Anesthetic plan and risks discussed with patient.

## 2017-10-23 NOTE — ANESTHESIA POSTPROCEDURE EVALUATION
Patient: Jose Manuel Vasquez    Procedure Summary     Date Anesthesia Start Anesthesia Stop Room / Location    10/23/17 1879 1626  [facility]  /  [facility]       Procedure Diagnosis Surgeon Provider    QUADRICEPS TENDON REPAIR (Right Thigh) Rupture quadriceps tendon, right, initial encounter; Internal derangement of knee joint, right  (Rupture quadriceps tendon, right, initial encounter [S76.111A]; Internal derangement of knee joint, right [M23.91]) Terrence Lantigua MD Rodrigo Ethan Slade MD          Anesthesia Type: general, regional  Last vitals  BP   138/91 (10/23/17 1715)   Temp   36.5 °C (97.7 °F) (10/23/17 1715)   Pulse   92 (10/23/17 1715)   Resp   16 (10/23/17 1715)     SpO2   97 % (10/23/17 1715)     Post Anesthesia Care and Evaluation    Patient location during evaluation: bedside  Pain management: adequate  Airway patency: patent  Anesthetic complications: No anesthetic complications    Cardiovascular status: acceptable  Respiratory status: acceptable  Hydration status: acceptable

## 2017-10-23 NOTE — PLAN OF CARE
Problem: Patient Care Overview (Adult)  Goal: Plan of Care Review  Outcome: Outcome(s) achieved Date Met:  10/23/17    10/23/17 1743   Coping/Psychosocial Response Interventions   Plan Of Care Reviewed With patient;spouse;daughter   Patient Care Overview   Progress improving   Outcome Evaluation   Outcome Summary/Follow up Plan TOLERATING ORAL INTAKE. EDUCATION COMPLETED.        Goal: Adult Individualization and Mutuality  Outcome: Outcome(s) achieved Date Met:  10/23/17    10/23/17 1743   Individualization   Patient Specific Interventions PREOP NERVE BLOCK. POST OP MEDS AS NOTED. DENIES PAIN, JUST STIFFNESS       Goal: Discharge Needs Assessment  Outcome: Outcome(s) achieved Date Met:  10/23/17    10/23/17 1743   Discharge Needs Assessment   Concerns To Be Addressed no discharge needs identified   Equipment Needed After Discharge crutches

## 2017-10-23 NOTE — INTERVAL H&P NOTE
H&P reviewed. The patient was examined and there are no changes to the H&P.     Terrence Lantigua MD  10/23/17

## 2017-10-23 NOTE — ANESTHESIA PROCEDURE NOTES
Peripheral Block    Patient location during procedure: pre-op  Start time: 10/23/2017 11:34 AM  Stop time: 10/23/2017 11:43 AM  Reason for block: at surgeon's request and post-op pain management  Performed by  Anesthesiologist: ERNESTO SUGGS  Preanesthetic Checklist  Completed: patient identified, site marked, surgical consent, pre-op evaluation, timeout performed, IV checked, risks and benefits discussed and monitors and equipment checked  Prep:  Sterile barriers:gloves  Prep: ChloraPrep  Patient monitoring: blood pressure monitoring, continuous pulse oximetry and EKG  Procedure  Sedation:yes    Guidance:ultrasound guided  ULTRASOUND INTERPRETATION.  Using ultrasound guidance a 22 G gauge needle was placed in close proximity to the femoral nerve, at which point, under ultrasound guidance anesthetic was injected in the area of the nerve and spread of the anesthesia was seen on ultrasound in close proximity thereto.  There were no abnormalities seen on ultrasound; a digital image was taken; and the patient tolerated the procedure with no complications. Images:still images obtained    Laterality:right  Block Type:femoral  Injection Technique:single-shot  Needle Type:short-bevel  Needle Gauge:22 G    Medications  Comment:Dexamethasone 4mg added to injectate  Local Injected:ropivacaine 0.5% Local Amount Injected:20mL  Post Assessment  Injection Assessment: negative aspiration for heme, no paresthesia on injection and incremental injection  Patient Tolerance:comfortable throughout block  Complications:no

## 2017-10-23 NOTE — DISCHARGE INSTRUCTIONS
What to expect after a Nerve Block    Nerve blocks administered to block pain affect many types of nerves, including those nerves that control movement, pain, and normal sensation. Following a nerve block, you may notice some bruising at the site where the block was given. You may experience sensations such as: numbness of the affected area or limb, tingling, heaviness (that is the limb feels heavy to you), weakness or inability to move the affected arm or leg, or a feeling as if your arm or leg has “fallen asleep.”     A nerve block can last from 2 to 36 hours depending on the medications used.  Usually the weakness wears off first followed by the tingling and heaviness. As the block wears off, you may begin to notice pain; however, this sequence of events may occur in any order. Typically, you will be able to move your limb before you will feel it. Once a nerve block begins to wear off, the effects are usually completely gone within 60 minutes.  If you experience continued side effects that you believe are block related for longer than 48 hours, please call your healthcare provider. Please see block-specific instructions below.    Instructions for any Block involving the leg/foot:   If you have had a leg /foot block, you should not bear weight on the affected leg until the block has worn off. After the block has worn off, weight bearing should be as directed by your surgeon. You may be sent home with crutches. You are at high risk for falling because of the anesthetic effects on your leg. Please use caution when standing or trying to move or walk. Have someone assist you until your leg and foot function have returned to normal.     Protection of a “blocked” limb  • After a nerve block, you cannot feel pain, pressure, or extremes of temperature in the affected limb. And because of this, your blocked limb is at more risk for injury. For example, it is possible to burn your limb on an extremely hot surface  without feeling it.     • When resting, it is important to reposition your limb periodically to avoid prolonged pressure on it. This may require the use of pillows and padding.    • While sleeping, you should avoid rolling onto the affected limb or putting too much pressure on it.     • If you have a cast or tight dressing, check the color of your fingers or toes of the affected limb. Call your surgeon if they look discolored (that is, dusky, dark colored).    • Use caution in cold weather. Cover your limb appropriately to protect it from the cold.    Pain Management:  Your surgeon will give you a prescription for pain medication. Begin taking this before the nerve block wears off. Bear in mind that sometimes the block can wear off in the middle of the night.

## 2017-10-23 NOTE — PLAN OF CARE
Problem: Patient Care Overview (Adult)  Goal: Plan of Care Review  Outcome: Ongoing (interventions implemented as appropriate)    10/23/17 1714   Coping/Psychosocial Response Interventions   Plan Of Care Reviewed With patient   Patient Care Overview   Progress improving       Goal: Adult Individualization and Mutuality  Outcome: Ongoing (interventions implemented as appropriate)  Goal: Discharge Needs Assessment  Outcome: Ongoing (interventions implemented as appropriate)    10/23/17 1714   Discharge Needs Assessment   Concerns To Be Addressed no discharge needs identified   Equipment Needed After Discharge crutches, axillary         Problem: Perioperative Period (Adult)  Goal: Signs and Symptoms of Listed Potential Problems Will be Absent or Manageable (Perioperative Period)  Outcome: Ongoing (interventions implemented as appropriate)    10/23/17 1714   Perioperative Period   Problems Assessed (Perioperative Period) all   Problems Present (Perioperative Period) pain

## 2017-10-23 NOTE — H&P (VIEW-ONLY)
Patient:  Jose Manuel Vasquez is a 57 y.o. male    Chief Complaint/ Reason for Visit:    Chief Complaint   Patient presents with   • Right Knee - Establish Care, Pain, Joint Swelling       HPI:  This pleasant gentleman presents today, accompanied by his wife Kimberlee, requesting evaluation of a recent injury to his right knee and thigh.  He says that this past Sunday he was helping his son move a double wide risk for greater that was very heavy and it just started ranging and his left foot slipped.  The refrigerator, which was on a yahir, came down onto his right knee and thigh.  He had the immediate onset of severe sharp pain and swelling in his right knee with pain radiating up the lateral aspect of his right thigh.    He was evaluated in the emergency department at [facility].  I spoke with the emergency department provider that night who said that he felt a defect in his quadriceps tendon and it was fairly certain the patient had completely ruptured his right quadriceps tendon.  The patient was placed in a knee immobilizer and fitted with crutches.      The patient is now walking with one crutch and using the knee immobilizer.  He said that he doesn't have much pain when he is up moving around, but if he tries to bend or extend the knee has moderate pain of an aching nature.  He felt like he had a severe, crushing pain at the time of the injury.  Anything that involves walking or standing, especially standing in the shower without the brace, increases his pain.  He does not have any calf pain, chest pain, or shortness of breath.  He has no history of serious injury to his right knee previously at that he can recall.  Unfortunately, he was otherwise uninjured and this event.    PMH:    Past Medical History:   Diagnosis Date   • Fracture of wrist     Left       PSH:  History reviewed. No pertinent surgical history.    Social Hx:    Social History     Social History   • Marital status:      Spouse name: N/A   • Number of  children: N/A   • Years of education: N/A     Occupational History   • Not on file.     Social History Main Topics   • Smoking status: Never Smoker   • Smokeless tobacco: Never Used   • Alcohol use Yes   • Drug use: No   • Sexual activity: Defer     Other Topics Concern   • Not on file     Social History Narrative       Family Hx:    Family History   Problem Relation Age of Onset   • No Known Problems Mother    • No Known Problems Father    • No Known Problems Sister    • Heart disease Brother    • Diabetes Brother    • No Known Problems Maternal Aunt    • No Known Problems Maternal Uncle    • No Known Problems Paternal Aunt    • No Known Problems Paternal Uncle    • No Known Problems Maternal Grandmother    • No Known Problems Maternal Grandfather    • No Known Problems Paternal Grandmother    • No Known Problems Paternal Grandfather    • Anesthesia problems Neg Hx    • Broken bones Neg Hx    • Cancer Neg Hx    • Clotting disorder Neg Hx    • Collagen disease Neg Hx    • Dislocations Neg Hx    • Osteoporosis Neg Hx    • Rheumatologic disease Neg Hx    • Scoliosis Neg Hx    • Severe sprains Neg Hx        Meds:    Current Outpatient Prescriptions:   •  HYDROcodone-acetaminophen (NORCO) 7.5-325 MG per tablet, May take 1 or 2 tablets by mouth every 4 hours as needed for pain after surgery., Disp: 60 tablet, Rfl: 0  •  apixaban (ELIQUIS) 2.5 MG tablet tablet, Take 1 tablet by mouth every 12 hours beginning no sooner than 12 hours after the end of your surgery., Disp: 28 tablet, Rfl: 0  •  cephalexin (KEFLEX) 500 MG capsule, Take 1 tablet every 6 hours for only 4 doses after surgery., Disp: 4 capsule, Rfl: 0    Allergies:  No Known Allergies    ROS:  Review of Systems   Musculoskeletal: Positive for arthralgias, gait problem, joint swelling and myalgias.   All other systems reviewed and are negative.      Vitals:    10/19/17 0918   BP: 120/88   BP Location: Left arm   Patient Position: Sitting   Cuff Size: Adult   Temp:  "97.6 °F (36.4 °C)   TempSrc: Temporal Artery    Weight: 240 lb (109 kg)   Height: 69\" (175.3 cm)       Physical Exam   Constitutional: He is oriented to person, place, and time. He appears well-developed and well-nourished. He is cooperative.   HENT:   Head: Normocephalic and atraumatic.   Mouth/Throat: Oropharynx is clear and moist.   Eyes: Conjunctivae and EOM are normal. Pupils are equal, round, and reactive to light. No scleral icterus.   Neck: No JVD present. No tracheal deviation present.   Cardiovascular: Normal rate, regular rhythm, normal heart sounds and intact distal pulses.    No murmur heard.  Pulses:       Dorsalis pedis pulses are 2+ on the right side, and 2+ on the left side.        Posterior tibial pulses are 2+ on the right side, and 2+ on the left side.   Pulmonary/Chest: Effort normal and breath sounds normal. No respiratory distress.   Musculoskeletal:        Right knee: He exhibits decreased range of motion, swelling and effusion. He exhibits no laceration, no erythema and normal alignment.   Right knee: The right knee has moderate swelling and effusion, which I suspect is a hemarthrosis.  He has tenderness globally especially in the suprapatellar pouch and the suprapatellar region.  Interestingly enough, he can now do an active straight leg raise.  However, he does have discomfort against resisted extension of his right knee, and does definitely have a strength deficit to extension.  I do believe that I feel a defect in the suprapatellar region consistent with a near complete avulsion of his quadriceps tendon.  His right calf is soft and nontender with no venous cord.  I could not accurately assess stability due to the effusion, pain, and tenderness.   Neurological: He is alert and oriented to person, place, and time.   Skin: Skin is warm and dry. No rash noted. No cyanosis or erythema. Nails show no clubbing.   Psychiatric: He has a normal mood and affect. His speech is normal and behavior is " normal. Judgment and thought content normal.   Nursing note and vitals reviewed.              Radiology: X-rays: AP and lateral the patient's right knee were reviewed on the [facility] PACS system from his ER visit on Sunday.  The patient has significant soft tissue swelling and effusion.  He does not seem to have neftaly patella infra.  However, there is a calcific body in the suprapatellar effusion that I suspect may actually be an avulsed osteophyte that has traveled with much of his quadriceps tendon that I suspect to be avulsed.  Comparison images were not available.          Assessment:  1. Rupture quadriceps tendon, right, initial encounter    - MRI Knee Right Without Contrast; Future  - ceFAZolin (ANCEF) 2 g in sodium chloride 0.9 % 100 mL IVPB; Infuse 2 g into a venous catheter 1 (One) Time.  - Case Request; Standing  - Case Request    2. Internal derangement of knee joint, right    - MRI Knee Right Without Contrast; Future  - ceFAZolin (ANCEF) 2 g in sodium chloride 0.9 % 100 mL IVPB; Infuse 2 g into a venous catheter 1 (One) Time.  - Case Request; Standing  - Case Request        Plan:  I had a long discussion with the patient and his wife regarding his current circumstances.  I explained that I was surprisingly good do an active straight leg raise as his history knee exam certainly suggested a quadriceps tendon rupture.  It may be that he has sufficient strength in his tensor fascia a lot of that he is actually able to use this as an active extensor now that his acute pain has subsided.  I certainly do believe I feel a defect when I palpate the suprapatellar region.    That being said, I think we need an MRI to define exactly what is going on.  I will be surprised if he does not have a near complete quadriceps tendon rupture.  We also need to see what else may be going on inside his knee.    I suspect he has a surgical injury and therefore I think we should go ahead and schedule him for quadriceps tendon  repair in anticipation of this finding.  Of course, if this is not found to be the case, we can always cancel the surgery.  The patient and his wife are in agreement.     I discussed everything with the patient and his wife.  We reviewed the options, both surgical and nonsurgical, the potential outcomes good and bad of these options, and the pluses and minuses, risks and benefits thereof.  I reviewed that surgery had risks, and that surgery had no guarantees.  I advised the patient that nonsurgical management of this injury may not result in a good outcome.  I explained the elements of the injury that made me concerned that nonsurgical management was probably not the best option, though again, I emphasized that there was no guarantee of a good outcome with surgical treatment.  We reviewed that the risks of surgery include but are not limited to bleeding, infection, injury to nerves, blood vessels, tendons, ligaments, or other soft tissue structures, skin wound problems or skin breakdown, permanent pain, permanent stiffness, permanent limping, and/or permanent swelling, the need for future surgical procedures, blood clots, arthritis, pulmonary embolism, pneumonia, stroke, heart attack, amputation, and even death due to these or other complications.  I advised them that the patient would likely never regain his full, preinjury range of motion, and that he may always have some degree of discomfort, pain, and/or stiffness.  He may have some degree of permanent limping and/or swelling.  They said they understand.    We discussed the particulars of surgical treatment of this problem and the various options in that respect.  I went over the details of transosseous tunnel technique for repair of the quadriceps tendon.  We also reviewed, in detail, the expected postoperative activity recommendations, restrictions, and requirements, and reviewed the recovery from the standpoint restrictions and expectations.  All of their  questions were answered to their satisfaction.  The patient said he said he understood the options and agrees with proceeding with surgery scheduling understanding that if his MRI does not reveal what clinically I believed to be a quadriceps tendon rupture that is near-complete that we can always cancel the surgery.    Narcotic counseling was performed in the usual fashion, and I emphasized the risks of narcotic use and the dangers. We discussed the potential for dependency and addiction, and we discussed things to avoid when taking these medications including specific activities to avoid and other sedating substances including alcohol or other medications.     A [facility] report was checked, and the patient confirmed that she had reviewed the house bill 1 warnings on the exam all. Appropriate narcotic pain medication was issued. The patient was also prescribed an appropriate prophylactic postoperative antibiotic.        Orders Placed This Encounter   Procedures   • MRI Knee Right Without Contrast     Standing Status:   Future     Standing Expiration Date:   10/19/2018     Order Specific Question:   Reason for Exam:     Answer:   I suspect this patient has a high-grade partial/near-complete rupture of his right quadriceps tendon.  I need to evaluate his injury, and also evaluate the right knee for associated surgical internal derangements.   • Follow anesthesia standing orders.   • Obtain informed consent     Order Specific Question:   Informed Consent Given For     Answer:   Quadriceps tendon repair     Order Specific Question:   Laterality     Answer:   Right   • Clorhexidine skin prep     Educate and review protocol with patient.   • Provide instructions to patient regarding NPO status

## 2017-10-23 NOTE — ANESTHESIA PROCEDURE NOTES
Airway  Urgency: elective    Airway not difficult    General Information and Staff    Patient location during procedure: OR  Anesthesiologist: RENDER, ERNESTO RAY  CRNA: ZACARIAS OHLLIS    Indications and Patient Condition  Indications for airway management: airway protection    Preoxygenated: yes  MILS not maintained throughout  Mask difficulty assessment: 1 - vent by mask    Final Airway Details  Final airway type: supraglottic airway      Successful airway: classic  Size 5    Number of attempts at approach: 1    Additional Comments  Inflated to seal.

## 2017-10-23 NOTE — OP NOTE
QUADRICEPS TENDON REPAIR  Progress Note    Jose Manuel Vasquez  10/23/2017    Pre-op Diagnosis:   Rupture quadriceps tendon, right, initial encounter [S76.111A]  Internal derangement of knee joint, right [M23.91]       Post-Op Diagnosis Codes:     * Rupture quadriceps tendon, right, initial encounter [S76.111A]     * Internal derangement of knee joint, right [M23.91]    Procedure/CPT® Codes:      Procedure(s):  RIGHT QUADRICEPS TENDON REPAIR    Surgeon(s):  Terrence Lantigua MD    Anesthesia: General with Block    Staff:   Cell Saver : Kelly Camacho  Circulator: Abbie Vazquez RN; Brook Aguilar RN; Mounika Galo RN; Rom Raymond RN  Scrub Person: Netta Grant; Shaji Heard    Estimated Blood Loss: 60 mL    Urine Voided: * No values recorded between 10/23/2017  1:19 PM and 10/23/2017  4:22 PM *    Specimens:                None      Drains: none          Findings: The most superficial/anterior aspect of the quadriceps tendon remained intact and attached to the anterior aspect of the patella superiorly.  The vast majority of the tendon, however, was completely avulsed from the patella.    Complications: None noted intraoperatively    Tourniquet time:  90 minutes    Technique:  The patient was taken to the operating room placed comfortably supine on the table.  I had initialed the correct knee as the operative site after confirming everything in the holding area.  Meticulous attention was given a comfortable positioning and careful padding of all extremities and bony prominences.  General anesthesia was induced without difficulty.  The preoperative pause was conducted in the usual fashion to confirm that everything was correct.  Appropriate IV antibiotics were administered in the usual fashion.  A tourniquet was then applied high on the correct thigh over a layer padding.    The correct lower extremity was prepared and draped in usual sterile fashion from the level of the tourniquet on the thigh,  distally.  The extremity was elevated for a minute or so to exsanguinate it by gravity, and then the tourniquet was elevated to 250 mmHg.    A longitudinal incision was now created beginning a couple of fingerbreadths above the patella, and directed inferiorly in the midline, terminating just proximal to the joint line.  There was immediate effluence of hematoma/hemarthrosis/effusion.  Meticulous hemostasis was obtained and maintained throughout the case with careful use of electrocautery.  The knee was copiously irrigated and any remaining hematoma was meticulously removed.  The portion of the patient's quadriceps tendon that was not avulsed was a thin slip of the anterior aspect of the tendon, still centrally attached to the superior pole the patella.  This presented a technical challenge to reattaching the more than 90% of the quadriceps tendon that was avulsed from the superior pole.  Appropriate blunt retractors, including an [employer], were employed throughout the case to allow for exposure of the superior pole the patella.  However, understandably, progress was a bit slower than average due to this challenge which made access to the superior pole of bit more difficult than is typically seen when there is a 100% avulsion of the tendon.    I now used a scalpel followed by a sharp rongeur to properly prepare the superior pole of the patella.  I created a nice trough with a bleeding bony bed.  Next I created for longitudinal drill holes evenly spaced across the width of the patella from superior to inferior.  I used multiple drill bits to ensure that these drill holes were parallel.  The wound was copiously irrigated.    I now exposed the distal end of the avulsed quadriceps tendon, and carefully freshened the edges for repair.  I now obtained control of the central portion of the quadriceps tendon using a #5 Kevlar Arthrex suture.  Once it had been woven through the central portion of the tendon, I passed the 2  suture in an's through the more central of the 4 previously created drill holes.  I controlled the suture ends with a snap.  I then used 2 additional #5 Arthrex Kevlar sutures and performed similar tendon repairing weaves on either side of the central suture, passing the ends of the sutures appropriately through the 2 more lateral and medial pairs of drill holes, again controlling the ends with snaps.  I now placed a bump under the knee such that it was flexed at about 30-35°.  I carefully tied the central repair suture tape while the assistant was holding tension on the other 2 such that the avulsed end of the quadriceps tendon was anatomically reattached to the superior pole the patella.  I then subsequently tied the other 2 suture tapes, creating a very strong and secure anatomic repair.  I tested the repair by flexing the knee and it was found to be excellent.  The ends of the suture were now carefully trimmed.    The entire wound was now carefully sprayed with platelet rich plasma, per the patient's request preoperatively.    I now repaired the retinacular rents on either side of the quadriceps tendon with #1 Vicryl suture.  The tourniquet was now deflated after time of 90 minutes.  No significant bleeding was noted.    The wound was again copiously irrigated.  The incision was now approximated in layers using standard techniques with absorbable sutures in the deeper fascia and subcuticular sutures, and the skin edges being held in final approximation with skin staples.  As each layer was closed, I sprayed a bit of the platelet poor plasma preparation, finally spraying the last bit along the staple line itself.  All sponge, needle, and equipment counts were reported to the surgeon by the operating room staff to be correct.  The skin was cleaned with wet and dry sponges, and sterile dressings were applied.  The vascularity of the patient's toes was checked after the application of the dressing and found to be  excellent.  The patient was then carefully placed in the appropriate knee brace.    The patient was awakened from anesthesia without incident and taken to the recovery room in satisfactory condition.        Terrence Lantigua MD     Date: 10/23/2017  Time: 4:50 PM

## 2017-10-23 NOTE — PLAN OF CARE
Problem: Perioperative Period (Adult)  Goal: Signs and Symptoms of Listed Potential Problems Will be Absent or Manageable (Perioperative Period)  Outcome: Outcome(s) achieved Date Met:  10/23/17    10/23/17 6687   Perioperative Period   Problems Assessed (Perioperative Period) all   Problems Present (Perioperative Period) situational response;other (see comments)  (STIFFNESS)

## 2017-10-23 NOTE — PLAN OF CARE
Problem: Perioperative Period (Adult)  Goal: Signs and Symptoms of Listed Potential Problems Will be Absent or Manageable (Perioperative Period)  Outcome: Ongoing (interventions implemented as appropriate)    10/23/17 1047   Perioperative Period   Problems Assessed (Perioperative Period) all

## 2017-11-02 RX ORDER — HYDROCODONE BITARTRATE AND ACETAMINOPHEN 7.5; 325 MG/1; MG/1
TABLET ORAL
Qty: 60 TABLET | Refills: 0 | Status: SHIPPED | OUTPATIENT
Start: 2017-11-02 | End: 2017-12-04

## 2017-11-06 ENCOUNTER — OFFICE VISIT (OUTPATIENT)
Dept: ORTHOPEDIC SURGERY | Facility: CLINIC | Age: 57
End: 2017-11-06

## 2017-11-06 VITALS — WEIGHT: 230 LBS | TEMPERATURE: 98.8 F | HEIGHT: 69 IN | BODY MASS INDEX: 34.07 KG/M2

## 2017-11-06 DIAGNOSIS — Z98.890 STATUS POST TENDON REPAIR: Primary | ICD-10-CM

## 2017-11-06 DIAGNOSIS — S76.111D RUPTURE OF RIGHT QUADRICEPS TENDON, SUBSEQUENT ENCOUNTER: ICD-10-CM

## 2017-11-06 PROCEDURE — 99024 POSTOP FOLLOW-UP VISIT: CPT | Performed by: ORTHOPAEDIC SURGERY

## 2017-11-06 NOTE — PROGRESS NOTES
Patient:  Jose Manuel Vaqsuez is a 57 y.o. male    Chief Complaint/ Reason for Visit:    Chief Complaint   Patient presents with   • Right Knee - Post-op, Pain     quad       HPI:  The patient is here, accompanied by his wife, for a scheduled 2 week postop check after a right quadriceps tendon repair.  This was accomplished with transosseous tunnels.  The patient has been doing well.  His brace, on one side, however is not functioning.  The block function doesn't work.    The patient has had no fever, chills, sweats, or shakes.  He has had no calf pain, chest pain, or shortness of breath.  His pain at this point is actually minimal and has been well controlled.  He has been working on range of motion of his ankle foot and toes and doing some quad setting as instructed.      PMH:    Past Medical History:   Diagnosis Date   • Black-out (not amnesia) 2015    CARDIAC WORK UP OK, NO PROBLEMS SINCE   • History of MRSA infection 2013    SKIN GROIN, ARMPIT    • Rupture of quadriceps tendon        PSH:    Past Surgical History:   Procedure Laterality Date   • NO PAST SURGERIES     • MO FIX QUAD/HAMSTR MUSC RUPT,PRIMARY Right 10/23/2017    Procedure: QUADRICEPS TENDON REPAIR;  Surgeon: Terrence Lantigua MD;  Location: [facility] OR [facility];  Service: Orthopedics       Social Hx:    Social History     Social History   • Marital status:      Spouse name: N/A   • Number of children: N/A   • Years of education: N/A     Occupational History   • Not on file.     Social History Main Topics   • Smoking status: Never Smoker   • Smokeless tobacco: Never Used   • Alcohol use Yes      Comment: SOCIAL   • Drug use: No   • Sexual activity: Defer     Other Topics Concern   • Not on file     Social History Narrative       Family Hx:    Family History   Problem Relation Age of Onset   • No Known Problems Mother    • No Known Problems Father    • No Known Problems Sister    • Heart disease Brother    • Diabetes Brother    • No Known Problems Maternal  "Aunt    • No Known Problems Maternal Uncle    • No Known Problems Paternal Aunt    • No Known Problems Paternal Uncle    • No Known Problems Maternal Grandmother    • No Known Problems Maternal Grandfather    • No Known Problems Paternal Grandmother    • No Known Problems Paternal Grandfather    • Anesthesia problems Neg Hx    • Broken bones Neg Hx    • Cancer Neg Hx    • Clotting disorder Neg Hx    • Collagen disease Neg Hx    • Dislocations Neg Hx    • Osteoporosis Neg Hx    • Rheumatologic disease Neg Hx    • Scoliosis Neg Hx    • Severe sprains Neg Hx    • Malig Hyperthermia Neg Hx        Meds:    Current Outpatient Prescriptions:   •  apixaban (ELIQUIS) 2.5 MG tablet tablet, Take 1 tablet by mouth every 12 hours beginning no sooner than 12 hours after the end of your surgery., Disp: 28 tablet, Rfl: 0  •  HYDROcodone-acetaminophen (NORCO) 7.5-325 MG per tablet, May take 1 or 2 tablets by mouth every 4 hours as needed for pain after surgery., Disp: 60 tablet, Rfl: 0  •  cephalexin (KEFLEX) 500 MG capsule, Take 1 tablet every 6 hours for only 4 doses after surgery., Disp: 4 capsule, Rfl: 0    Allergies:  No Known Allergies    ROS:  Review of Systems    Vitals:    11/06/17 0925   Temp: 98.8 °F (37.1 °C)   TempSrc: Temporal Artery    Weight: 230 lb (104 kg)   Height: 69\" (175.3 cm)       Physical Exam    The patient is awake, alert, and oriented ×3.  The patient is in no acute distress.  Breathing is regular and unlabored with a respiratory rate of 12/m.  Extraocular movements and pupillary responses are symmetrically intact. Sclerae are anicteric.   Hearing is within normal limits.  Speech is within normal limits.  There is no jugular venous distention.    Right knee: The incision looks great.  There is hardly any swelling.  He has good quadriceps action.  His right calf is soft and nontender with no venous cord.  Homans sign is negative.  He has strong pedal pulses.  Sensory exam is intact and normal light touch.  "         Assessment:  1. Status post tendon repair      2. Rupture of right quadriceps tendon, subsequent encounter          Plan:  I discussed everything with the patient and his wife.  Activity recommendations, restrictions, and precautions, including an definite prohibition from driving, were discussed.  They voiced understanding.  All their questions were answered to their satisfaction.

## 2017-11-06 NOTE — PATIENT INSTRUCTIONS
The day following the completion of your Eliquis therapy, please begin taking a 325 mg enteric coated aspirin twice daily, once after breakfast and once after the evening meal.  You should continue this for 4 weeks.    You still must remain nonweightbearing on your right lower extremity, with all other restrictions remain in effect as well.

## 2017-12-04 ENCOUNTER — OFFICE VISIT (OUTPATIENT)
Dept: ORTHOPEDIC SURGERY | Facility: CLINIC | Age: 57
End: 2017-12-04

## 2017-12-04 VITALS — BODY MASS INDEX: 34.07 KG/M2 | TEMPERATURE: 98.7 F | WEIGHT: 230 LBS | HEIGHT: 69 IN

## 2017-12-04 DIAGNOSIS — S83.231D COMPLEX TEAR OF MEDIAL MENISCUS OF RIGHT KNEE AS CURRENT INJURY, SUBSEQUENT ENCOUNTER: ICD-10-CM

## 2017-12-04 DIAGNOSIS — Z98.890 STATUS POST TENDON REPAIR: Primary | ICD-10-CM

## 2017-12-04 DIAGNOSIS — S76.111D RUPTURE OF QUADRICEPS TENDON, RIGHT, SUBSEQUENT ENCOUNTER: ICD-10-CM

## 2017-12-04 PROCEDURE — 99024 POSTOP FOLLOW-UP VISIT: CPT | Performed by: ORTHOPAEDIC SURGERY

## 2017-12-04 NOTE — PROGRESS NOTES
Patient:  Jose Manuel Vasquez is a 57 y.o. male    Chief Complaint/ Reason for Visit:    Chief Complaint   Patient presents with   • Right Knee - Follow-up, Pain       HPI:  The patient returns today, accompanied by his wife, for scheduled follow-up on his right quadriceps tendon repair.  It has been 6 weeks since surgery.  He has very little discomfort.  He has had no calf pain, chest pain, or shortness of breath.  He has had no trouble with his incision.  He does not like the brace.      PMH:    Past Medical History:   Diagnosis Date   • Black-out (not amnesia) 2015    CARDIAC WORK UP OK, NO PROBLEMS SINCE   • History of MRSA infection 2013    SKIN GROIN, ARMPIT    • Rupture of quadriceps tendon        PSH:    Past Surgical History:   Procedure Laterality Date   • NO PAST SURGERIES     • [place] FIX QUAD/HAMSTR MUSC RUPT,PRIMARY Right 10/23/2017    Procedure: QUADRICEPS TENDON REPAIR;  Surgeon: Terrence Lantigua MD;  Location: [facility] OR [facility] [place];  Service: Orthopedics       Social Hx:    Social History     Social History   • Marital status:      Spouse name: N/A   • Number of children: N/A   • Years of education: N/A     Occupational History   • Not on file.     Social History Main Topics   • Smoking status: Never Smoker   • Smokeless tobacco: Never Used   • Alcohol use Yes      Comment: SOCIAL   • Drug use: No   • Sexual activity: Defer     Other Topics Concern   • Not on file     Social History Narrative       Family Hx:    Family History   Problem Relation Age of Onset   • No Known Problems Mother    • No Known Problems Father    • No Known Problems Sister    • Heart disease Brother    • Diabetes Brother    • No Known Problems Maternal Aunt    • No Known Problems Maternal Uncle    • No Known Problems Paternal Aunt    • No Known Problems Paternal Uncle    • No Known Problems Maternal Grandmother    • No Known Problems Maternal Grandfather    • No Known Problems Paternal Grandmother    • No Known Problems Paternal Grandfather  "   • Anesthesia problems Neg Hx    • Broken bones Neg Hx    • Cancer Neg Hx    • Clotting disorder Neg Hx    • Collagen disease Neg Hx    • Dislocations Neg Hx    • Osteoporosis Neg Hx    • Rheumatologic disease Neg Hx    • Scoliosis Neg Hx    • Severe sprains Neg Hx    • Malig Hyperthermia Neg Hx        Meds:  No current outpatient prescriptions on file.    Allergies:  No Known Allergies    ROS:  Review of Systems    Vitals:    12/04/17 1010   Temp: 98.7 °F (37.1 °C)   TempSrc: Temporal Artery    Weight: 230 lb (104 kg)   Height: 69\" (175.3 cm)       Physical Exam    The patient is awake, alert, and oriented ×3.  The patient is in no acute distress.  Breathing is regular and unlabored with a respiratory rate of 12/m.  Extraocular movements and pupillary responses are symmetrically intact. Sclerae are anicteric.   Hearing is within normal limits.  Speech is within normal limits.  There is no jugular venous distention.    Right knee: The incision is well healed.  He has expected muscular atrophy of the motor groups around the knee.  Active extension and active straight leg raising are intact.  I do not feel any defects or gaps in the area of his right quadriceps tendon repair.  His right calf is soft and nontender with no venous cord.  Pulses and sensory exam are intact and within normal limits distally.        Assessment:  1. Status post tendon repair    - Ambulatory Referral to Physical Therapy Evaluate and treat, Ortho    2. Rupture of quadriceps tendon, right, subsequent encounter    - Ambulatory Referral to Physical Therapy Evaluate and treat, Ortho    3. Complex tear of medial meniscus of right knee as current injury, subsequent encounter    - Ambulatory Referral to Physical Therapy Evaluate and treat, Ortho        Plan:  I discussed everything with the patient and his wife.  I think it's time to start physical therapy.  We discussed activity recommendations, restrictions, and precautions.  I cautioned him to " avoid aggressive athletic activity or training at this point as he was still at risk for rerupture.  He voiced understanding.  Appropriate referral for physical therapy was created.  I will see the patient back in about a month.  We discussed weaning from the brace.  All of his questions were answered to his satisfaction.        Orders Placed This Encounter   Procedures   • Ambulatory Referral to Physical Therapy Evaluate and treat, Ortho     Referral Priority:   Routine     Referral Type:   Therapy     Referral Reason:   Specialty Services Required     Requested Specialty:   Physical Therapy     Number of Visits Requested:   1

## 2017-12-18 ENCOUNTER — TELEPHONE (OUTPATIENT)
Dept: ORTHOPEDIC SURGERY | Facility: CLINIC | Age: 57
End: 2017-12-18

## 2017-12-19 NOTE — TELEPHONE ENCOUNTER
Completed and patient will  at the [place] [facility] ([facility]) in the next couple of days.

## 2018-01-02 ENCOUNTER — OFFICE VISIT (OUTPATIENT)
Dept: ORTHOPEDIC SURGERY | Facility: CLINIC | Age: 58
End: 2018-01-02

## 2018-01-02 VITALS — WEIGHT: 230 LBS | BODY MASS INDEX: 34.07 KG/M2 | HEIGHT: 69 IN

## 2018-01-02 DIAGNOSIS — Z98.890 STATUS POST TENDON REPAIR: Primary | ICD-10-CM

## 2018-01-02 DIAGNOSIS — S83.241D OTHER TEAR OF MEDIAL MENISCUS OF RIGHT KNEE AS CURRENT INJURY, SUBSEQUENT ENCOUNTER: ICD-10-CM

## 2018-01-02 PROCEDURE — 99024 POSTOP FOLLOW-UP VISIT: CPT | Performed by: ORTHOPAEDIC SURGERY

## 2018-01-02 NOTE — PROGRESS NOTES
"Patient:  Jose Manuel Vasquez is a 57 y.o. male    Chief Complaint/ Reason for Visit:    Chief Complaint   Patient presents with   • Right Knee - Pain, Follow-up       HPI:  The patient is here for scheduled follow-up on his right knee.  He is now 10 weeks status post repair of a quadriceps tendon rupture sustained in an accident when he was helping his son move a refrigerator and it fell onto his flexed knee.  He says he feels like he is doing \"great!\"  He feels like his muscle tone is coming back and he feels like he has essentially reestablish full range of motion of his right knee.  He has had no trouble with his incision.  He says he has been walking around his house, including going up and down stairs reciprocally, without his brace and has had no problems.  He has had no calf pain, chest pain, or shortness of breath.    Additionally, we know the patient has a torn medial meniscus in his knee sustained at the time of the original injury.  We will need to address that eventually.      PMH:    Past Medical History:   Diagnosis Date   • Black-out (not amnesia) 2015    CARDIAC WORK UP OK, NO PROBLEMS SINCE   • History of MRSA infection 2013    SKIN GROIN, ARMPIT    • Rupture of quadriceps tendon        PSH:    Past Surgical History:   Procedure Laterality Date   • NO PAST SURGERIES     • [place] FIX QUAD/HAMSTR MUSC RUPT,PRIMARY Right 10/23/2017    Procedure: QUADRICEPS TENDON REPAIR;  Surgeon: Terrence Lantigua MD;  [place] [facility] [place] [facility] [place]  Service: Orthopedics       Social Hx:    Social History     Social History   • Marital status:      Spouse name: N/A   • Number of children: N/A   • Years of education: N/A     Occupational History   • Not on file.     Social History Main Topics   • Smoking status: Never Smoker   • Smokeless tobacco: Never Used   • Alcohol use Yes      Comment: SOCIAL   • Drug use: No   • Sexual activity: Defer     Other Topics Concern   • Not on file     Social History Narrative       Family Hx:  " "  Family History   Problem Relation Age of Onset   • No Known Problems Mother    • No Known Problems Father    • No Known Problems Sister    • Heart disease Brother    • Diabetes Brother    • No Known Problems Maternal Aunt    • No Known Problems Maternal Uncle    • No Known Problems Paternal Aunt    • No Known Problems Paternal Uncle    • No Known Problems Maternal Grandmother    • No Known Problems Maternal Grandfather    • No Known Problems Paternal Grandmother    • No Known Problems Paternal Grandfather    • Anesthesia problems Neg Hx    • Broken bones Neg Hx    • Cancer Neg Hx    • Clotting disorder Neg Hx    • Collagen disease Neg Hx    • Dislocations Neg Hx    • Osteoporosis Neg Hx    • Rheumatologic disease Neg Hx    • Scoliosis Neg Hx    • Severe sprains Neg Hx    • Malig Hyperthermia Neg Hx        Meds:  No current outpatient prescriptions on file.    Allergies:  No Known Allergies    ROS:  Review of Systems    Vitals:    01/02/18 0850   Weight: 104 kg (230 lb)   Height: 175.3 cm (69\")     Body mass index is 33.97 kg/(m^2).    Physical Exam    The patient is awake, alert, and oriented ×3.  The patient is in no acute distress.  Breathing is regular and unlabored with a respiratory rate of 12/m.  Extraocular movements and pupillary responses are symmetrically intact. Sclerae are anicteric.   Hearing is within normal limits.  Speech is within normal limits.  There is no jugular venous distention.    He is walking well with only the slightest limp.  His right knee is inspected.  The anterior midline incisions beautifully healed.  There is no sign of infection.  There is no effusion.  His muscle tone in the quadriceps and in the lower leg has improved noticeably.  Obviously he still has some atrophy which is perfectly understandable.    His right calf is soft and nontender with no venous cord.  Pulses and sensory exam are normal distally in the right lower 70.  Motor strength is 5 over 5 on manual testing " though obviously he still has some more strengthening and muscle reconditioning to do to restore his right lower extremity to its preinjury status.            Assessment:     Diagnosis Plan   1. Status post tendon repair     2. Other tear of medial meniscus of right knee as current injury, subsequent encounter             Plan:  I discussed everything with the patient.  Were going to have the physical therapist continue to work with him and develop a home exercise program.  I'll see him back in about 8 weeks.  We will need to consider planning an arthroscopy to address his meniscus tear at that time.

## 2018-03-06 ENCOUNTER — OFFICE VISIT (OUTPATIENT)
Dept: ORTHOPEDIC SURGERY | Facility: CLINIC | Age: 58
End: 2018-03-06

## 2018-03-06 VITALS — BODY MASS INDEX: 35.19 KG/M2 | HEIGHT: 69 IN | WEIGHT: 237.6 LBS | TEMPERATURE: 98.3 F

## 2018-03-06 DIAGNOSIS — Z98.890 STATUS POST TENDON REPAIR: Primary | ICD-10-CM

## 2018-03-06 DIAGNOSIS — S76.111D RUPTURE OF RIGHT QUADRICEPS TENDON, SUBSEQUENT ENCOUNTER: ICD-10-CM

## 2018-03-06 PROCEDURE — 99212 OFFICE O/P EST SF 10 MIN: CPT | Performed by: ORTHOPAEDIC SURGERY

## 2018-03-06 NOTE — PROGRESS NOTES
Patient:  Jose Manuel Vasquez is a 57 y.o. male    Chief Complaint/ Reason for Visit:    Chief Complaint   Patient presents with   • Right Knee - Follow-up       HPI:  The patient returns for recheck on his right knee.  It has now been a little more than 19 weeks since I performed a repair of a complete rupture of his right quadriceps tendon.  He says he has no pain.  He says he's back in the gym and slowly but steadily working on weight lifting.  I asked him if he did leg extensions, and he confirmed that he did.  I advised him of the deleterious effects that these could have on the knee, and we discussed this in detail, and I suggested alternative exercises.  He voiced understanding.    With respect to his right knee, he has had no problems with his incision.  He currently has no pain.  He says he has reestablished a full range of motion.  He is walking without a limp, and can go up and down stairs reciprocally without any problem.  He has no popping, locking, catching, giving way, or any complaints of instability.      PMH:    Past Medical History:   Diagnosis Date   • Black-out (not amnesia) 2015    CARDIAC WORK UP OK, NO PROBLEMS SINCE   • History of MRSA infection 2013    SKIN GROIN, ARMPIT    • Rupture of quadriceps tendon        PSH:    Past Surgical History:   Procedure Laterality Date   • NO PAST SURGERIES     • [place] FIX QUAD/HAMSTR MUSC RUPT,PRIMARY Right 10/23/2017    Procedure: QUADRICEPS TENDON REPAIR;  Surgeon: Terrence Lantigua MD;  Location: [facility] OR [facility] [place];  Service: Orthopedics       Social Hx:    Social History     Social History   • Marital status:      Spouse name: N/A   • Number of children: N/A   • Years of education: N/A     Occupational History   • Not on file.     Social History Main Topics   • Smoking status: Never Smoker   • Smokeless tobacco: Never Used   • Alcohol use Yes      Comment: SOCIAL   • Drug use: No   • Sexual activity: Defer     Other Topics Concern   • Not on file     Social  "History Narrative       Family Hx:    Family History   Problem Relation Age of Onset   • No Known Problems Mother    • No Known Problems Father    • No Known Problems Sister    • Heart disease Brother    • Diabetes Brother    • No Known Problems Maternal Aunt    • No Known Problems Maternal Uncle    • No Known Problems Paternal Aunt    • No Known Problems Paternal Uncle    • No Known Problems Maternal Grandmother    • No Known Problems Maternal Grandfather    • No Known Problems Paternal Grandmother    • No Known Problems Paternal Grandfather    • Anesthesia problems Neg Hx    • Broken bones Neg Hx    • Cancer Neg Hx    • Clotting disorder Neg Hx    • Collagen disease Neg Hx    • Dislocations Neg Hx    • Osteoporosis Neg Hx    • Rheumatologic disease Neg Hx    • Scoliosis Neg Hx    • Severe sprains Neg Hx    • Malig Hyperthermia Neg Hx        Meds:  No current outpatient prescriptions on file.    Allergies:  No Known Allergies    ROS:  Review of Systems    Vitals:    03/06/18 1153   Temp: 98.3 °F (36.8 °C)   TempSrc: Temporal Artery    Weight: 108 kg (237 lb 9.6 oz)   Height: 174 cm (68.5\")     Body mass index is 35.6 kg/(m^2).    Physical Exam    The patient is awake, alert, and oriented ×3.  The patient is in no acute distress.  Breathing is regular and unlabored with a respiratory rate of 12/m.  Extraocular movements and pupillary responses are symmetrically intact. Sclerae are anicteric.   Hearing is within normal limits.  Speech is within normal limits.  There is no jugular venous distention.    He walks well with no limp.  He is doing an excellent job of bringing back the muscle tone in his right lower extremity.  It is still not quite what he has in the left lower extremity, but the improvement that he is made even since the last office visit is dramatic.    Right knee: The incisions well healed with no sign of infection.  He has normal motor strength throughout the right lower extremity.  He has no " tenderness.  His right knee is stable in all planes.  He has no effusion.  He has a full smooth range of motion of the right knee.  His right calf is soft and nontender.  Distally he has strongly palpable regular pedal pulses.  Sensory exam is normal.            Assessment:     Diagnosis Plan   1. Status post tendon repair     2. Rupture of right quadriceps tendon, subsequent encounter             Plan:  I discussed everything with the patient at length.  Given his current excellent status, his intelligence, and his general fitness, I don't think scheduled follow-up as needed anymore.  We did discuss the importance of continued gradual advancement of activities and appropriate precautions and instructions reviewed.  All of his questions were answered to his satisfaction.  For now, and I'm just going to have him follow-up as needed.    Addendum: I was actually finishing the patient's note the day after his visit, when I realized that he had a medial meniscus tear that was revealed on his MRI.  I called to make sure that he was not in any way symptomatic.  He confirmed, as per his history of the office visit, that he is not having any trouble.  I went over specific mechanical symptoms and other feelings that can be caused by untreated meniscus tears and he was having none of them.    He asked the very intelligent question as to whether the meniscus tear could've been present prior to the quadriceps tendon injury, and I advised him that this was quite possible.    Since, at this point, he is asymptomatic from the standpoint of meniscal pathology.  I do not think we need to treat this arthroscopically.    However, I did review with him, in great detail, very specific things to look for with respect to the meniscus.  And, I cautioned him in general to come in for follow-up for any concerns he may have with his knee in the future.    He voiced understanding and agreement.  I will be happy to see him back any time needed  in the future.

## 2018-05-29 ENCOUNTER — TELEPHONE (OUTPATIENT)
Dept: ORTHOPEDIC SURGERY | Facility: CLINIC | Age: 58
End: 2018-05-29

## 2018-05-29 NOTE — TELEPHONE ENCOUNTER
Patient called wanting to let WAN know that he has a right ruptured medial tibial tendon and is in a boot. He saw Dr. Mendez with [facility]. He says he asked them to forward info to WAN. WAN did surgery for right knee ruptured quad tendon previously, but there was no accident with this. He would like to speak with WAN.

## 2018-05-30 NOTE — TELEPHONE ENCOUNTER
If you could please let him know that I would be happy to see him, (next available date), for a second opinion regarding this this disorder, I would be grateful.    Thank you

## 2018-09-10 ENCOUNTER — OFFICE VISIT (OUTPATIENT)
Dept: INTERNAL MEDICINE | Facility: CLINIC | Age: 58
End: 2018-09-10

## 2018-09-10 VITALS
BODY MASS INDEX: 34.96 KG/M2 | TEMPERATURE: 98.3 F | HEIGHT: 69 IN | RESPIRATION RATE: 12 BRPM | SYSTOLIC BLOOD PRESSURE: 128 MMHG | HEART RATE: 71 BPM | WEIGHT: 236 LBS | DIASTOLIC BLOOD PRESSURE: 80 MMHG | OXYGEN SATURATION: 98 %

## 2018-09-10 DIAGNOSIS — G47.09 OTHER INSOMNIA: ICD-10-CM

## 2018-09-10 DIAGNOSIS — M76.821 TIBIAL TENDONITIS, POSTERIOR, RIGHT: ICD-10-CM

## 2018-09-10 DIAGNOSIS — R73.03 PRE-DIABETES: ICD-10-CM

## 2018-09-10 DIAGNOSIS — S76.111D RUPTURE QUADRICEPS TENDON, RIGHT, SUBSEQUENT ENCOUNTER: ICD-10-CM

## 2018-09-10 DIAGNOSIS — Z00.00 HEALTHCARE MAINTENANCE: Primary | ICD-10-CM

## 2018-09-10 PROBLEM — Z98.890 STATUS POST TENDON REPAIR: Status: RESOLVED | Noted: 2017-11-06 | Resolved: 2018-09-10

## 2018-09-10 PROBLEM — M23.91 INTERNAL DERANGEMENT OF KNEE JOINT, RIGHT: Status: RESOLVED | Noted: 2017-10-19 | Resolved: 2018-09-10

## 2018-09-10 PROCEDURE — 90471 IMMUNIZATION ADMIN: CPT | Performed by: INTERNAL MEDICINE

## 2018-09-10 PROCEDURE — 99396 PREV VISIT EST AGE 40-64: CPT | Performed by: INTERNAL MEDICINE

## 2018-09-10 PROCEDURE — 90715 TDAP VACCINE 7 YRS/> IM: CPT | Performed by: INTERNAL MEDICINE

## 2018-09-10 PROCEDURE — 93000 ELECTROCARDIOGRAM COMPLETE: CPT | Performed by: INTERNAL MEDICINE

## 2018-09-10 NOTE — PATIENT INSTRUCTIONS
Shingrix (new shingles shot; 2 shot series) check at pharmacy  Hepatitis A (2 shot series), complete series

## 2018-09-10 NOTE — PROGRESS NOTES
Annual Exam (new pt cpe )      HPI  Jose Manuel Vasquez is a 58 y.o. male presents to establish, new pt CPE:  1. Quadricep tear on R - had repair with Dr. Lantigua 10/2017.  Has rehabbed and has not pain.  Was traumatic injury after dropped fridge on R knee.  Doing well.   2. Tibial tendon strain - on MRI, sees Dr. Lomax. In ankle brace right now for management, s/p boot for 6 weeks at the time. No surgery.    3. Hx of pre diabetes - told several years ago.  Feels like has managed well with DM and exercise.    4. Hx of MRSA - had issue in 2013 and multiple areas of skin infection. Used oral Abx (Bactrim) and resolved.  Did not do nares regimen to eradicte.        Review of Systems   Constitution: Positive for weight loss (a few pounds). Negative for chills, fever, malaise/fatigue and weight gain.   HENT: Negative for congestion, hearing loss, odynophagia and sore throat.    Eyes: Negative for discharge, double vision, pain and redness.        Last eye exam years ago; no glasses     Cardiovascular: Negative for chest pain, dyspnea on exertion, irregular heartbeat, leg swelling, near-syncope, palpitations and syncope.   Respiratory: Positive for snoring (less over recent time). Negative for cough, shortness of breath and sleep disturbances due to breathing.    Hematologic/Lymphatic: Negative for bleeding problem. Does not bruise/bleed easily.   Skin: Negative for rash and suspicious lesions.   Musculoskeletal: Negative for joint pain, joint swelling, muscle cramps, muscle weakness and myalgias.   Gastrointestinal: Negative for bloating, abdominal pain, constipation, diarrhea, dysphagia, heartburn (when overeats rich foods, uses PRN OTC Prilosec, < 1 x/ week. ), nausea and vomiting.        Never had C-scope   Genitourinary: Positive for nocturia (1-2x/ night). Negative for bladder incontinence, dysuria, frequency and hematuria.   Neurological: Positive for excessive daytime sleepiness (variable). Negative for dizziness,  "headaches and light-headedness.   Psychiatric/Behavioral: Negative for depression. The patient has insomnia (occasional, uses melatonin and is helpful). The patient is not nervous/anxious.    Allergic/Immunologic: Negative for environmental allergies and persistent infections.       ECG 12 Lead  Date/Time: 9/10/2018 3:44 PM  Performed by: HILARY MCKEON  Authorized by: HILARY MCKEON   Comparison: not compared with previous ECG   Previous ECG: no previous ECG available  Rhythm: sinus rhythm  Ectopy: atrial premature contractions  Rate: normal  BPM: 66  ST Segments: ST segments normal  T Waves: T waves normal  QRS axis: normal  Clinical impression: normal ECG  Comments: QTc - 415  Indication - new pt CPE, baseline              Problem List:    Patient Active Problem List   Diagnosis   • Rupture quadriceps tendon, right, subsequent encounter   • Tear of medial meniscus of right knee, current   • Lactose intolerance   • Tibial tendonitis, posterior, right   • Pre-diabetes   • Other insomnia       Medical History:    Past Medical History:   Diagnosis Date   • Black-out (not amnesia) 2015    CARDIAC WORK UP OK, NO PROBLEMS SINCE   • History of MRSA infection 2013    SKIN GROIN, ARMPIT    • Lactose intolerance     manages with avoidance   • Pre-diabetes 2014    managed with diet and exercise.    • Rupture of quadriceps tendon         Social History:    Social History     Social History   • Marital status:      Spouse name: N/A   • Number of children: 2   • Years of education: N/A     Occupational History   • [employer]     Social History Main Topics   • Smoking status: Never Smoker   • Smokeless tobacco: Never Used   • Alcohol use Yes      Comment: 3-6 drinks/ week   • Drug use: No   • Sexual activity: Yes     Partners: Female      Comment: wife only; no hx STD's     Other Topics Concern   • Not on file     Social History Narrative    Diet - overall healthy, \"it is a work in " "progress\"; eats fruits and vegetables, less sugar    Exercise - in gym all of life;     Caffeine - 1-2 cups coffee in AM       Family History:   Family History   Problem Relation Age of Onset   • COPD Mother    • No Known Problems Father    • Heart disease Brother    • Diabetes Brother    • No Known Problems Maternal Aunt    • No Known Problems Maternal Uncle    • No Known Problems Paternal Aunt    • No Known Problems Paternal Uncle    • No Known Problems Maternal Grandmother    • No Known Problems Maternal Grandfather    • No Known Problems Paternal Grandmother    • No Known Problems Paternal Grandfather    • No Known Problems Brother    • No Known Problems Son    • No Known Problems Daughter    • Anesthesia problems Neg Hx    • Broken bones Neg Hx    • Cancer Neg Hx    • Clotting disorder Neg Hx    • Collagen disease Neg Hx    • Dislocations Neg Hx    • Osteoporosis Neg Hx    • Rheumatologic disease Neg Hx    • Scoliosis Neg Hx    • Severe sprains Neg Hx    • Malig Hyperthermia Neg Hx        Surgical History:   Past Surgical History:   Procedure Laterality Date   • CO FIX QUAD/HAMSTR MUSC RUPT,PRIMARY Right 10/23/2017    Procedure: QUADRICEPS TENDON REPAIR;  Surgeon: Terrence Lantigua MD;  [place] [facility] [place] [facility] [place]  Service: [place]opedics       No current outpatient prescriptions on file.    Vitals:    09/10/18 1414   BP: 128/80   Pulse: 71   Resp: 12   Temp: 98.3 °F (36.8 °C)   SpO2: 98%       Physical Exam   Constitutional: He is oriented to person, place, and time. He appears well-developed and well-nourished. He is cooperative. No distress.   Muscular habitus     HENT:   Head: Normocephalic and atraumatic.   Right Ear: Hearing, tympanic membrane, external ear and ear canal normal.   Left Ear: Hearing, tympanic membrane, external ear and ear canal normal.   Nose: Nose normal.   Mouth/Throat: Uvula is midline, oropharynx is clear and moist and mucous membranes are normal. No oropharyngeal exudate.   Class " II-III Mallampati  Large tonsils B     Eyes: Pupils are equal, round, and reactive to light. Conjunctivae, EOM and lids are normal. Right eye exhibits no discharge. Left eye exhibits no discharge. No scleral icterus.   Neck: Normal range of motion and full passive range of motion without pain. Neck supple. Carotid bruit is not present. No thyroid mass and no thyromegaly present.   Large circumference     Cardiovascular: Normal rate, regular rhythm, S1 normal, S2 normal and normal heart sounds.  Exam reveals no gallop and no friction rub.    No murmur heard.  Pulses:       Radial pulses are 2+ on the right side, and 2+ on the left side.        Dorsalis pedis pulses are 2+ on the right side, and 2+ on the left side.        Posterior tibial pulses are 2+ on the right side, and 2+ on the left side.   Pulmonary/Chest: Effort normal and breath sounds normal. No respiratory distress. He has no wheezes. He has no rhonchi. He has no rales.   Abdominal: Soft. Bowel sounds are normal. He exhibits no distension and no mass. There is no hepatosplenomegaly. There is no tenderness. There is no rebound and no guarding. A hernia (umbilical) is present.   Genitourinary: Rectum normal and prostate normal. Prostate is not enlarged and not tender.   Musculoskeletal: Normal range of motion. He exhibits no edema.     Vascular Status -  His right foot exhibits abnormal foot edema (up to ankle, trace, non-pitting). His right foot exhibits normal foot vasculature . His left foot exhibits normal foot vasculature  and no edema.  Skin Integrity  -  His right foot skin is intact.His left foot skin is intact..  Lymphadenopathy:     He has no cervical adenopathy.     He has no axillary adenopathy.        Right: No inguinal adenopathy present.        Left: No inguinal adenopathy present.   Neurological: He is alert and oriented to person, place, and time. He has normal strength and normal reflexes. He displays no tremor. No cranial nerve deficit or  sensory deficit. He exhibits normal muscle tone. Gait normal.   Skin: Skin is warm, dry and intact. No rash noted.   Psychiatric: He has a normal mood and affect. His speech is normal and behavior is normal. Thought content normal. Cognition and memory are normal.   Vitals reviewed.      Assessment/ Plan  Diagnoses and all orders for this visit:    Healthcare maintenance  -     Tdap Vaccine Greater Than or Equal To 8yo IM  -     CBC & Differential  -     Comprehensive Metabolic Panel  -     Hemoglobin A1c  -     Hepatitis C Antibody  -     Lipid Panel With LDL / HDL Ratio  -     TSH  -     UA / M With / Rflx Culture([facility] ONLY) - Urine, Clean Catch  -     Vitamin D 25 Hydroxy  -     PSA Screen  -     Ambulatory Referral For Screening Colonoscopy    Rupture quadriceps tendon, right, subsequent encounter    Tibial tendonitis, posterior, right    Pre-diabetes  -     Comprehensive Metabolic Panel  -     Hemoglobin A1c    Other insomnia    Other orders  -     ECG 12 Lead        Return in about 1 year (around 9/10/2019).      Discussion:  Jose Manuel Vasquez is a 58 y.o. male presents to establish, new pt CPE:  1. Quadricep tear on R - s.p repair with Dr. Lantigua 10/2017.  S/p PT and doing well.  2. Tibial tendon strain - dx on MRI, per Dr. Lomax. Ankle brace at this time.   3. Hx of pre diabetes - managing with diet and exercise. Check FBS and A1C on labs. D/W pt risk of DMII development.   4. Hx of MRSA - had issue in 2013, resolved.   5. Insomnia - Intermittent, manages with PRN melatonin.   6. HM - check labs; Flu - this season; Tdap - today; Hep A #1 UTD; Shingrix at pharmacy; C-scope due, refer; QUANG OK, check PSA after counseled pt on risk vs. Benefit; Hep C Ab screen; exercise with goal of some weight loss.    RTC one year CPE, F labs prior

## 2018-09-25 LAB
25(OH)D3+25(OH)D2 SERPL-MCNC: 45.1 NG/ML (ref 30–100)
ALBUMIN SERPL-MCNC: 4.5 G/DL (ref 3.5–5.2)
ALBUMIN/GLOB SERPL: 1.5 G/DL
ALP SERPL-CCNC: 80 U/L (ref 39–117)
ALT SERPL-CCNC: 24 U/L (ref 1–41)
APPEARANCE UR: CLEAR
AST SERPL-CCNC: 27 U/L (ref 1–40)
BACTERIA #/AREA URNS HPF: NORMAL /HPF
BASOPHILS # BLD AUTO: 0.04 10*3/MM3 (ref 0–0.2)
BASOPHILS NFR BLD AUTO: 0.6 % (ref 0–1.5)
BILIRUB SERPL-MCNC: 0.8 MG/DL (ref 0.1–1.2)
BILIRUB UR QL STRIP: NEGATIVE
BUN SERPL-MCNC: 19 MG/DL (ref 6–20)
BUN/CREAT SERPL: 16.2 (ref 7–25)
CALCIUM SERPL-MCNC: 9.6 MG/DL (ref 8.6–10.5)
CHLORIDE SERPL-SCNC: 99 MMOL/L (ref 98–107)
CHOLEST SERPL-MCNC: 214 MG/DL (ref 0–200)
CO2 SERPL-SCNC: 26.3 MMOL/L (ref 22–29)
COLOR UR: YELLOW
CREAT SERPL-MCNC: 1.17 MG/DL (ref 0.76–1.27)
EOSINOPHIL # BLD AUTO: 0.18 10*3/MM3 (ref 0–0.7)
EOSINOPHIL NFR BLD AUTO: 2.6 % (ref 0.3–6.2)
EPI CELLS #/AREA URNS HPF: NORMAL /HPF
ERYTHROCYTE [DISTWIDTH] IN BLOOD BY AUTOMATED COUNT: 12.8 % (ref 11.5–14.5)
GLOBULIN SER CALC-MCNC: 3 GM/DL
GLUCOSE SERPL-MCNC: 108 MG/DL (ref 65–99)
GLUCOSE UR QL: NEGATIVE
HBA1C MFR BLD: 5.7 % (ref 4.8–5.6)
HCT VFR BLD AUTO: 47.9 % (ref 40.4–52.2)
HCV AB S/CO SERPL IA: 0.1 S/CO RATIO (ref 0–0.9)
HDLC SERPL-MCNC: 55 MG/DL (ref 40–60)
HGB BLD-MCNC: 15.8 G/DL (ref 13.7–17.6)
HGB UR QL STRIP: NEGATIVE
IMM GRANULOCYTES # BLD: 0 10*3/MM3 (ref 0–0.03)
IMM GRANULOCYTES NFR BLD: 0 % (ref 0–0.5)
KETONES UR QL STRIP: NEGATIVE
LDLC SERPL CALC-MCNC: 148 MG/DL (ref 0–100)
LDLC/HDLC SERPL: 2.69 {RATIO}
LEUKOCYTE ESTERASE UR QL STRIP: NEGATIVE
LYMPHOCYTES # BLD AUTO: 2.01 10*3/MM3 (ref 0.9–4.8)
LYMPHOCYTES NFR BLD AUTO: 29.4 % (ref 19.6–45.3)
MCH RBC QN AUTO: 30.6 PG (ref 27–32.7)
MCHC RBC AUTO-ENTMCNC: 33 G/DL (ref 32.6–36.4)
MCV RBC AUTO: 92.8 FL (ref 79.8–96.2)
MICRO URNS: NORMAL
MICRO URNS: NORMAL
MONOCYTES # BLD AUTO: 0.64 10*3/MM3 (ref 0.2–1.2)
MONOCYTES NFR BLD AUTO: 9.4 % (ref 5–12)
NEUTROPHILS # BLD AUTO: 3.96 10*3/MM3 (ref 1.9–8.1)
NEUTROPHILS NFR BLD AUTO: 58 % (ref 42.7–76)
NITRITE UR QL STRIP: NEGATIVE
PH UR STRIP: 6.5 [PH] (ref 5–7.5)
PLATELET # BLD AUTO: 262 10*3/MM3 (ref 140–500)
POTASSIUM SERPL-SCNC: 4.7 MMOL/L (ref 3.5–5.2)
PROT SERPL-MCNC: 7.5 G/DL (ref 6–8.5)
PROT UR QL STRIP: NEGATIVE
PSA SERPL-MCNC: 1.15 NG/ML (ref 0–4)
RBC # BLD AUTO: 5.16 10*6/MM3 (ref 4.6–6)
RBC #/AREA URNS HPF: NORMAL /HPF
SODIUM SERPL-SCNC: 139 MMOL/L (ref 136–145)
SP GR UR: 1.01 (ref 1–1.03)
TRIGL SERPL-MCNC: 56 MG/DL (ref 0–150)
TSH SERPL DL<=0.005 MIU/L-ACNC: 0.9 MIU/ML (ref 0.27–4.2)
URINALYSIS REFLEX: NORMAL
UROBILINOGEN UR STRIP-MCNC: 0.2 MG/DL (ref 0.2–1)
VLDLC SERPL CALC-MCNC: 11.2 MG/DL (ref 5–40)
WBC # BLD AUTO: 6.83 10*3/MM3 (ref 4.5–10.7)
WBC #/AREA URNS HPF: NORMAL /HPF

## 2018-09-29 LAB
CRP SERPL HS-MCNC: 0.83 MG/L (ref 0–3)
Lab: NORMAL
WRITTEN AUTHORIZATION: NORMAL

## 2018-10-01 PROBLEM — E78.5 DYSLIPIDEMIA: Status: ACTIVE | Noted: 2018-10-01

## 2019-11-19 ENCOUNTER — OFFICE VISIT (OUTPATIENT)
Dept: INTERNAL MEDICINE | Facility: CLINIC | Age: 59
End: 2019-11-19

## 2019-11-19 VITALS
HEART RATE: 86 BPM | WEIGHT: 231 LBS | HEIGHT: 69 IN | TEMPERATURE: 97.5 F | SYSTOLIC BLOOD PRESSURE: 138 MMHG | OXYGEN SATURATION: 98 % | DIASTOLIC BLOOD PRESSURE: 86 MMHG | BODY MASS INDEX: 34.21 KG/M2

## 2019-11-19 DIAGNOSIS — R10.32 LEFT LOWER QUADRANT ABDOMINAL PAIN: ICD-10-CM

## 2019-11-19 DIAGNOSIS — Z00.00 HEALTHCARE MAINTENANCE: ICD-10-CM

## 2019-11-19 DIAGNOSIS — R19.7 ACUTE DIARRHEA: Primary | ICD-10-CM

## 2019-11-19 DIAGNOSIS — R19.4 CHANGE IN BOWEL HABIT: ICD-10-CM

## 2019-11-19 LAB
BILIRUB BLD-MCNC: NEGATIVE MG/DL
CLARITY, POC: CLEAR
COLOR UR: YELLOW
GLUCOSE UR STRIP-MCNC: NEGATIVE MG/DL
HCT VFR BLDA CALC: 48.4 % (ref 38–51)
HGB BLDA-MCNC: 14.7 G/DL (ref 12–17)
KETONES UR QL: NEGATIVE
LEUKOCYTE EST, POC: NEGATIVE
LYMPHOCYTES # BLD: 39 %
MCH, POC: 29.3
MCHC, POC: 30.4
MCV, POC: 96.3
MONOCYTES # BLD: 4.2 %
NITRITE UR-MCNC: NEGATIVE MG/ML
PH UR: 6.5 [PH] (ref 5–8)
PLATELET # BLD: 268 10*3/MM3
PMV BLD: 6.6 FL
POC NEUTROPHIL: 56.8 %
PROT UR STRIP-MCNC: NEGATIVE MG/DL
RBC # UR STRIP: NEGATIVE /UL
RBC, POC: 5.03
RDW, POC: 13.3
SP GR UR: 1.01 (ref 1–1.03)
UROBILINOGEN UR QL: NORMAL
WBC # BLD: 8.3 10*3/UL

## 2019-11-19 PROCEDURE — 81003 URINALYSIS AUTO W/O SCOPE: CPT | Performed by: INTERNAL MEDICINE

## 2019-11-19 PROCEDURE — 99214 OFFICE O/P EST MOD 30 MIN: CPT | Performed by: INTERNAL MEDICINE

## 2019-11-19 PROCEDURE — 85025 COMPLETE CBC W/AUTO DIFF WBC: CPT | Performed by: INTERNAL MEDICINE

## 2019-11-19 RX ORDER — BLACK COHOSH ROOT EXTRACT 80 MG
1 CAPSULE ORAL DAILY
Qty: 30 CAPSULE | Refills: 0
Start: 2019-11-19 | End: 2020-10-06

## 2019-11-19 NOTE — PROGRESS NOTES
"Abdominal Pain (x 4 days ) and Diarrhea      HPI  Jose Manuel Vasquez is a 59 y.o. male RTC in acute care:  'I think I am having some digestive issues\". Notes in past year has been doing well avoiding dairy with some noted intolerance to lactose.  Lost some weight with more plant based diet. About 15#.  Fine until Friday AM (4 days ago) when woke with some abdominal pain. \"A pit in my stomach, center left of naval\".  No pain to touch. Started same day with loose stool, \"really loose\".  No blood in stool. Passing stool would make pain better but pain would come back.  Nothing really makes pain worse.  \"Is persistent at this point, I cant seem to shake it\".  Stomach is loudly making noises in last few days as well.   No N/V noted.   No fevers.  Feels \"a little run down\".   No sick contacts.  No recent travel.   Started a probiotic this AM.  Never had C-scope.  \"I know, I need to get that\". \"I will get it . I am tired of fighting you on that\".    Review of Systems   Constitution: Positive for malaise/fatigue (mild). Negative for chills and fever.   Cardiovascular: Negative for dyspnea on exertion.   Respiratory: Negative for shortness of breath.    Hematologic/Lymphatic: Negative for bleeding problem.   Gastrointestinal: Positive for abdominal pain, change in bowel habit and diarrhea. Negative for bloating, anorexia, constipation, hematochezia, nausea and vomiting.   Genitourinary: Negative for dysuria.       The following portions of the patient's history were reviewed and updated as appropriate: allergies, current medications, past medical history, past social history and problem list.      Current Outpatient Medications:   •  Lactobacillus (ACIDOPHILUS PROBIOTIC) 100 MG capsule, Take 1 capsule by mouth Daily., Disp: 30 capsule, Rfl: 0  •  psyllium (METAMUCIL) 58.6 % powder, Take  by mouth Daily., Disp: , Rfl: 12    Vitals:    11/19/19 1429   BP: 138/86   Pulse: 86   Temp: 97.5 °F (36.4 °C)   SpO2: 98%   Weight: 105 kg (231 " "lb)   Height: 175.3 cm (69\")         Physical Exam   Constitutional: He is oriented to person, place, and time. He appears well-developed and well-nourished. No distress.   HENT:   Head: Normocephalic and atraumatic.   Mouth/Throat: Oropharynx is clear and moist. No oropharyngeal exudate.   Eyes: Conjunctivae are normal. Pupils are equal, round, and reactive to light. Right eye exhibits no discharge. Left eye exhibits no discharge. No scleral icterus.   Neck: Normal range of motion. Neck supple. Carotid bruit is not present.   Cardiovascular: Normal rate, regular rhythm and normal heart sounds.   Pulses:       Carotid pulses are 2+ on the right side, and 2+ on the left side.       Radial pulses are 2+ on the right side, and 2+ on the left side.   Pulmonary/Chest: Effort normal and breath sounds normal. No respiratory distress. He has no wheezes. He has no rales.   Abdominal: Soft. Bowel sounds are normal. He exhibits no distension and no mass. There is no tenderness. There is no rebound and no guarding. A hernia (umbilical, self reduces in lying position. ) is present.   Musculoskeletal: He exhibits no edema.   Neurological: He is alert and oriented to person, place, and time. No cranial nerve deficit.   Psychiatric: He has a normal mood and affect. His behavior is normal.   Vitals reviewed.    Results for orders placed or performed in visit on 11/19/19   POC CBC With / Auto Diff   Result Value Ref Range    WBC 8.3     RBC 5.03     Hemoglobin 14.7 12.0 - 17.0 g/dL    Hematocrit 48.4 38 - 51 %    MCV 96.3     MCH 29.3     MCHC 30.4 (L)     RDW-CV 13.3     MPV 6.6 (L)     Platelets 268 10*3/mm3    Neutrophil Rel % 56.8 %    Monocyte Rel % 4.2 %    Lymphocyte Rel % 39.0 %   POC Urinalysis Dipstick, Automated   Result Value Ref Range    Color Yellow Yellow, Straw, Dark Yellow, Liza    Clarity, UA Clear Clear    Specific Gravity  1.015 1.005 - 1.030    pH, Urine 6.5 5.0 - 8.0    Leukocytes Negative Negative    Nitrite, " UA Negative Negative    Protein, POC Negative Negative mg/dL    Glucose, UA Negative Negative, 1000 mg/dL (3+) mg/dL    Ketones, UA Negative Negative    Urobilinogen, UA Normal Normal    Bilirubin Negative Negative    Blood, UA Negative Negative         Assessment/ Plan  Diagnoses and all orders for this visit:    Acute diarrhea  -     POC CBC With / Auto Diff  -     POC Urinalysis Dipstick, Automated  -     psyllium (METAMUCIL) 58.6 % powder; Take  by mouth Daily.  -     Lactobacillus (ACIDOPHILUS PROBIOTIC) 100 MG capsule; Take 1 capsule by mouth Daily.    Change in bowel habit  -     POC CBC With / Auto Diff  -     POC Urinalysis Dipstick, Automated  -     psyllium (METAMUCIL) 58.6 % powder; Take  by mouth Daily.  -     Lactobacillus (ACIDOPHILUS PROBIOTIC) 100 MG capsule; Take 1 capsule by mouth Daily.    Left lower quadrant abdominal pain  -     POC CBC With / Auto Diff  -     POC Urinalysis Dipstick, Automated  -     psyllium (METAMUCIL) 58.6 % powder; Take  by mouth Daily.  -     Lactobacillus (ACIDOPHILUS PROBIOTIC) 100 MG capsule; Take 1 capsule by mouth Daily.    Healthcare maintenance  -     Ambulatory Referral For Screening Colonoscopy        Return in about 5 months (around 4/19/2020) for Annual physical.      Discussion:  Jose Manuel Vasquez is a 59 y.o. male RTC in acute care (new issue to examiner) with ~ 4 days of acute onset of loose stool, no blood or fever, with some associated low abdominal pain.  Exam is benign with only noted soft, fully reducible umbilical hernia.  Notes complete absence of LLQ pain to palpation on exam.  While Ddx is broad here I am more suspicious of simple viral gastroenteritis at this point, noting normal CBC and clear urine. Will have pt start probiotic daily and fiber supplement.  Call if any T> 100.5, progressive pain, blood in stool, or new sx.    HM - is due for C-scope and agrees to complete. Referral placed again today, pt counseled on need for colon CA screening.     RTC  5 months CPE, F labs prior

## 2020-01-13 ENCOUNTER — OFFICE VISIT (OUTPATIENT)
Dept: INTERNAL MEDICINE | Facility: CLINIC | Age: 60
End: 2020-01-13

## 2020-01-13 VITALS
SYSTOLIC BLOOD PRESSURE: 130 MMHG | DIASTOLIC BLOOD PRESSURE: 88 MMHG | WEIGHT: 232 LBS | HEART RATE: 60 BPM | BODY MASS INDEX: 34.36 KG/M2 | OXYGEN SATURATION: 98 % | HEIGHT: 69 IN | TEMPERATURE: 97.9 F

## 2020-01-13 DIAGNOSIS — B02.9 HERPES ZOSTER WITHOUT COMPLICATION: Primary | ICD-10-CM

## 2020-01-13 PROCEDURE — 99214 OFFICE O/P EST MOD 30 MIN: CPT | Performed by: INTERNAL MEDICINE

## 2020-01-13 RX ORDER — ACETAMINOPHEN 500 MG
TABLET ORAL
Qty: 30 TABLET | Refills: 0
Start: 2020-01-13 | End: 2020-10-06

## 2020-01-13 RX ORDER — VALACYCLOVIR HYDROCHLORIDE 1 G/1
1000 TABLET, FILM COATED ORAL 3 TIMES DAILY
Qty: 21 TABLET | Refills: 0 | Status: SHIPPED | OUTPATIENT
Start: 2020-01-13 | End: 2020-01-20

## 2020-01-13 RX ORDER — LIDOCAINE 50 MG/G
1 PATCH TOPICAL EVERY 24 HOURS
Qty: 15 EACH | Refills: 1 | Status: SHIPPED | OUTPATIENT
Start: 2020-01-13 | End: 2020-10-06

## 2020-01-13 NOTE — PROGRESS NOTES
"Rash (left middle back started saturday)      HPI  Jose Manuel Vasquez is a 59 y.o. male RTC in acute care:   Notes taht about one week ago noted some L pectoralis and shoulder pain. Thought pulled muscle and did some stretches to help. Pain as persisted however. On \"Saturday or Sunday morning\" woke up with rash noted on L posterior shoulder. Painful rash. Painful to touch. Pain is getting worse. Today noted small red bump under L arm in axilla.  THinks has shingles.   Did not get shingles shot after our discussion at his CPE, but recalls our discussion.     Review of Systems   Constitution: Negative for chills and fever.   Cardiovascular: Negative for dyspnea on exertion.   Respiratory: Negative for shortness of breath.    Skin: Positive for color change (redness at rash on L shoulder area) and rash. Negative for itching.   Musculoskeletal: Negative for joint pain, muscle weakness and stiffness.   Neurological: Negative for focal weakness.       The following portions of the patient's history were reviewed and updated as appropriate: allergies, current medications, past medical history, past social history and problem list.      Current Outpatient Medications:   •  Lactobacillus (ACIDOPHILUS PROBIOTIC) 100 MG capsule, Take 1 capsule by mouth Daily., Disp: 30 capsule, Rfl: 0  •  acetaminophen (TYLENOL) 500 MG tablet, 2 tabs PO Q 8 hours PRN, Disp: 30 tablet, Rfl: 0  •  lidocaine (LIDODERM) 5 %, Place 1 patch on the skin as directed by provider Daily. Remove & Discard patch within 12 hours or as directed by MD, Disp: 15 each, Rfl: 1  •  valACYclovir (VALTREX) 1000 MG tablet, Take 1 tablet by mouth 3 (Three) Times a Day for 7 days., Disp: 21 tablet, Rfl: 0    Vitals:    01/13/20 1604   BP: 130/88   Pulse: 60   Temp: 97.9 °F (36.6 °C)   SpO2: 98%   Weight: 105 kg (232 lb)   Height: 175.3 cm (69\")     Body mass index is 34.26 kg/m².      Physical Exam   Constitutional: He is oriented to person, place, and time. He appears " well-developed and well-nourished. No distress.   HENT:   Head: Normocephalic and atraumatic.   Pulmonary/Chest: Effort normal. No respiratory distress.   Musculoskeletal:        Left shoulder: He exhibits normal range of motion, no deformity, no pain and normal strength.   Neurological: He is alert and oriented to person, place, and time. No cranial nerve deficit. Gait normal.   Skin: Rash (grouped papules with pale peaks overlying oval area of erythema on L posterior shoulder in mid-scapula. ; similar single papule under L arm at pectoralis insertion location) noted.   Psychiatric: He has a normal mood and affect. His behavior is normal.   Vitals reviewed.      Assessment/ Plan  Diagnoses and all orders for this visit:    Herpes zoster without complication  -     valACYclovir (VALTREX) 1000 MG tablet; Take 1 tablet by mouth 3 (Three) Times a Day for 7 days.  -     acetaminophen (TYLENOL) 500 MG tablet; 2 tabs PO Q 8 hours PRN  -     lidocaine (LIDODERM) 5 %; Place 1 patch on the skin as directed by provider Daily. Remove & Discard patch within 12 hours or as directed by MD        Return for Next scheduled follow up.      Discussion:  Jose Manuel Vasquez is a 59 y.o. male RTC In acute care (new issue to examiner) with > 1 week of L shoulder pain with new onset in last 24-48 hours of vessicular/ papular rash on L shoulder and L axilla in ~T2 dermatome.  C/W shingles. Will start Valtrex tx dose ASAP. Tylenol alternating with NSAID for pain control. Lidocaine patches PRN. Reviewed natural varied course of shingles, potential for need of more pain control, use of meds, contagiousness to unvaccinated babies, and etiology of shingles. Reviewed Shingrix, noting will hold vaccine until ~ 1 year after acute case. Pt to call if pain progressive or any other new issues.

## 2020-09-15 ENCOUNTER — PREP FOR SURGERY (OUTPATIENT)
Dept: OTHER | Facility: HOSPITAL | Age: 60
End: 2020-09-15

## 2020-09-15 DIAGNOSIS — Z12.11 SCREEN FOR COLON CANCER: ICD-10-CM

## 2020-09-15 DIAGNOSIS — Z80.0 FAMILY HISTORY OF COLON CANCER: Primary | ICD-10-CM

## 2020-09-17 PROBLEM — Z12.11 SCREEN FOR COLON CANCER: Status: ACTIVE | Noted: 2020-09-17

## 2020-09-17 PROBLEM — Z80.0 FAMILY HISTORY OF COLON CANCER: Status: ACTIVE | Noted: 2020-09-17

## 2020-09-23 DIAGNOSIS — Z00.00 HEALTHCARE MAINTENANCE: Primary | ICD-10-CM

## 2020-09-23 DIAGNOSIS — E78.5 DYSLIPIDEMIA: ICD-10-CM

## 2020-09-23 DIAGNOSIS — Z12.5 SCREENING FOR PROSTATE CANCER: ICD-10-CM

## 2020-09-23 DIAGNOSIS — R73.03 PRE-DIABETES: ICD-10-CM

## 2020-10-01 LAB
ALBUMIN SERPL-MCNC: 4.7 G/DL (ref 3.5–5.2)
ALBUMIN/GLOB SERPL: 2.2 G/DL
ALP SERPL-CCNC: 76 U/L (ref 39–117)
ALT SERPL-CCNC: 23 U/L (ref 1–41)
APPEARANCE UR: CLEAR
AST SERPL-CCNC: 29 U/L (ref 1–40)
BACTERIA #/AREA URNS HPF: NORMAL /HPF
BASOPHILS # BLD AUTO: 0.07 10*3/MM3 (ref 0–0.2)
BASOPHILS NFR BLD AUTO: 1.1 % (ref 0–1.5)
BILIRUB SERPL-MCNC: 1.1 MG/DL (ref 0–1.2)
BILIRUB UR QL STRIP: NEGATIVE
BUN SERPL-MCNC: 17 MG/DL (ref 6–20)
BUN/CREAT SERPL: 14.5 (ref 7–25)
CALCIUM SERPL-MCNC: 9.4 MG/DL (ref 8.6–10.5)
CHLORIDE SERPL-SCNC: 101 MMOL/L (ref 98–107)
CHOLEST SERPL-MCNC: 201 MG/DL (ref 0–200)
CO2 SERPL-SCNC: 27 MMOL/L (ref 22–29)
COLOR UR: YELLOW
CREAT SERPL-MCNC: 1.17 MG/DL (ref 0.76–1.27)
EOSINOPHIL # BLD AUTO: 0.18 10*3/MM3 (ref 0–0.4)
EOSINOPHIL NFR BLD AUTO: 2.7 % (ref 0.3–6.2)
EPI CELLS #/AREA URNS HPF: NORMAL /HPF (ref 0–10)
ERYTHROCYTE [DISTWIDTH] IN BLOOD BY AUTOMATED COUNT: 12.4 % (ref 12.3–15.4)
GLOBULIN SER CALC-MCNC: 2.1 GM/DL
GLUCOSE SERPL-MCNC: 103 MG/DL (ref 65–99)
GLUCOSE UR QL: NEGATIVE
HBA1C MFR BLD: 5.6 % (ref 4.8–5.6)
HCT VFR BLD AUTO: 43.6 % (ref 37.5–51)
HDLC SERPL-MCNC: 55 MG/DL (ref 40–60)
HGB BLD-MCNC: 15.1 G/DL (ref 13–17.7)
HGB UR QL STRIP: NEGATIVE
IMM GRANULOCYTES # BLD AUTO: 0.01 10*3/MM3 (ref 0–0.05)
IMM GRANULOCYTES NFR BLD AUTO: 0.2 % (ref 0–0.5)
KETONES UR QL STRIP: NEGATIVE
LDLC SERPL CALC-MCNC: 130 MG/DL (ref 0–100)
LEUKOCYTE ESTERASE UR QL STRIP: NEGATIVE
LYMPHOCYTES # BLD AUTO: 1.99 10*3/MM3 (ref 0.7–3.1)
LYMPHOCYTES NFR BLD AUTO: 30.2 % (ref 19.6–45.3)
MCH RBC QN AUTO: 32.4 PG (ref 26.6–33)
MCHC RBC AUTO-ENTMCNC: 34.6 G/DL (ref 31.5–35.7)
MCV RBC AUTO: 93.6 FL (ref 79–97)
MICRO URNS: NORMAL
MICRO URNS: NORMAL
MONOCYTES # BLD AUTO: 0.6 10*3/MM3 (ref 0.1–0.9)
MONOCYTES NFR BLD AUTO: 9.1 % (ref 5–12)
MUCOUS THREADS URNS QL MICRO: PRESENT /HPF
NEUTROPHILS # BLD AUTO: 3.74 10*3/MM3 (ref 1.7–7)
NEUTROPHILS NFR BLD AUTO: 56.7 % (ref 42.7–76)
NITRITE UR QL STRIP: NEGATIVE
NRBC BLD AUTO-RTO: 0 /100 WBC (ref 0–0.2)
PH UR STRIP: 7 [PH] (ref 5–7.5)
PLATELET # BLD AUTO: 247 10*3/MM3 (ref 140–450)
POTASSIUM SERPL-SCNC: 4.6 MMOL/L (ref 3.5–5.2)
PROT SERPL-MCNC: 6.8 G/DL (ref 6–8.5)
PROT UR QL STRIP: NEGATIVE
PSA SERPL-MCNC: 1.67 NG/ML (ref 0–4)
RBC # BLD AUTO: 4.66 10*6/MM3 (ref 4.14–5.8)
RBC #/AREA URNS HPF: NORMAL /HPF (ref 0–2)
SODIUM SERPL-SCNC: 139 MMOL/L (ref 136–145)
SP GR UR: 1.02 (ref 1–1.03)
TRIGL SERPL-MCNC: 78 MG/DL (ref 0–150)
TSH SERPL DL<=0.005 MIU/L-ACNC: 1.36 UIU/ML (ref 0.27–4.2)
URINALYSIS REFLEX: NORMAL
UROBILINOGEN UR STRIP-MCNC: 0.2 MG/DL (ref 0.2–1)
VLDLC SERPL CALC-MCNC: 15.6 MG/DL
WBC # BLD AUTO: 6.59 10*3/MM3 (ref 3.4–10.8)
WBC #/AREA URNS HPF: NORMAL /HPF (ref 0–5)

## 2020-10-06 ENCOUNTER — OFFICE VISIT (OUTPATIENT)
Dept: INTERNAL MEDICINE | Facility: CLINIC | Age: 60
End: 2020-10-06

## 2020-10-06 VITALS
TEMPERATURE: 97.1 F | DIASTOLIC BLOOD PRESSURE: 80 MMHG | RESPIRATION RATE: 12 BRPM | OXYGEN SATURATION: 97 % | HEIGHT: 69 IN | SYSTOLIC BLOOD PRESSURE: 128 MMHG | BODY MASS INDEX: 33.18 KG/M2 | HEART RATE: 67 BPM | WEIGHT: 224 LBS

## 2020-10-06 DIAGNOSIS — Z80.0 FAMILY HISTORY OF COLON CANCER: ICD-10-CM

## 2020-10-06 DIAGNOSIS — Z23 NEED FOR INFLUENZA VACCINATION: ICD-10-CM

## 2020-10-06 DIAGNOSIS — G47.09 OTHER INSOMNIA: ICD-10-CM

## 2020-10-06 DIAGNOSIS — S76.111D RUPTURE QUADRICEPS TENDON, RIGHT, SUBSEQUENT ENCOUNTER: ICD-10-CM

## 2020-10-06 DIAGNOSIS — I83.93 ASYMPTOMATIC VARICOSE VEINS OF BOTH LOWER EXTREMITIES: ICD-10-CM

## 2020-10-06 DIAGNOSIS — R73.01 IFG (IMPAIRED FASTING GLUCOSE): ICD-10-CM

## 2020-10-06 DIAGNOSIS — Z00.00 HEALTHCARE MAINTENANCE: Primary | ICD-10-CM

## 2020-10-06 DIAGNOSIS — E78.5 DYSLIPIDEMIA: ICD-10-CM

## 2020-10-06 PROBLEM — Z12.11 SCREEN FOR COLON CANCER: Status: RESOLVED | Noted: 2020-09-17 | Resolved: 2020-10-06

## 2020-10-06 PROBLEM — M76.821 TIBIAL TENDONITIS, POSTERIOR, RIGHT: Status: RESOLVED | Noted: 2018-09-10 | Resolved: 2020-10-06

## 2020-10-06 PROCEDURE — 90471 IMMUNIZATION ADMIN: CPT | Performed by: INTERNAL MEDICINE

## 2020-10-06 PROCEDURE — 99396 PREV VISIT EST AGE 40-64: CPT | Performed by: INTERNAL MEDICINE

## 2020-10-06 PROCEDURE — 90686 IIV4 VACC NO PRSV 0.5 ML IM: CPT | Performed by: INTERNAL MEDICINE

## 2020-10-06 NOTE — PROGRESS NOTES
Annual Exam (review of medical issues) and Anxiety      HPI  Jose Manuel Vasquez is a 60 y.o. male  RTC in yearly CPE, review of medical issues: Has been doing well.  No residual pain from shingles in 1/2020. No remnant rash.   New grandchild over the weekend.  Notes stress and anxiety are higher with all that is going on 2020. Sleep is not as good as used to be. Feels like all explainable. Feels like does not need intervention. Never really had anxiety issues in past. 'I am conscious of it'.  Exercise is helpful, 'I try to deal with it on my own'.   Admits enjoys sweets and takes in a lot of chocolate.  Will often have some candy after lunch. Will have cookies at home. Has sweet item most days.  No sugar in drinks. Does some protein powder both meal replacement and in addition to diet as well.   1. Hx of Quadricep tear on R - s/p repair with Dr. Lantigua 10/2017.  Really has not had other issues with this since surgery. Missed arch support on R while was healing from surgery and then started back on arch support and seemed to help.  R calf and R ankle are always mildly swollen compared to L.  No limitations at all.    2. Hx of pre diabetes -  Notes weight is down.  Better diet overall.  Exercise is biking 12-15 miles QOD. Gym 2-3 x/ week.   3. HM -  C-scope planned 12/2/20.  Has appt.    Review of Systems   Constitution: Negative for chills, fever, malaise/fatigue, weight gain and weight loss.   HENT: Negative for congestion, hearing loss, odynophagia and sore throat.    Eyes: Negative for discharge, double vision, pain and redness.        Last eye exam unknown; no glasses; using cheaters now.    Cardiovascular: Negative for chest pain, dyspnea on exertion, irregular heartbeat, leg swelling (trace in R ankle, post-op, no pitting), near-syncope, palpitations and syncope.   Respiratory: Negative for cough and shortness of breath.    Endocrine: Negative for polydipsia, polyphagia and polyuria.   Hematologic/Lymphatic: Negative  "for bleeding problem. Does not bruise/bleed easily.   Skin: Negative for rash and suspicious lesions.   Musculoskeletal: Positive for myalgias (in B shoulders and upper neck, relieved with stretching. ). Negative for joint pain, joint swelling, muscle cramps, muscle weakness and neck pain.   Gastrointestinal: Negative for constipation, diarrhea, dysphagia, heartburn, nausea and vomiting.   Genitourinary: Negative for dysuria, frequency, hematuria, hesitancy and incomplete emptying.   Neurological: Negative for dizziness, headaches, light-headedness, loss of balance, numbness and paresthesias.   Psychiatric/Behavioral: Negative for depression. The patient has insomnia (variable). The patient is not nervous/anxious.         \"Irritable at times\"       The following portions of the patient's history were reviewed and updated as appropriate: allergies, current medications, past family history, past medical history, past social history, past surgical history and problem list.      Current Outpatient Medications:   •  Probiotic Product (4X PROBIOTIC PO), Take  by mouth., Disp: , Rfl:   •  Zinc Sulfate (ZINC-220 PO), Take  by mouth., Disp: , Rfl:     Vitals:    10/06/20 1116   BP: 128/80   Pulse: 67   Resp: 12   Temp: 97.1 °F (36.2 °C)   SpO2: 97%   Weight: 102 kg (224 lb)   Height: 175.3 cm (69\")     Body mass index is 33.08 kg/m².      Physical Exam  Vitals signs reviewed.   Constitutional:       General: He is not in acute distress.     Appearance: Normal appearance. He is well-developed. He is not ill-appearing or toxic-appearing.   HENT:      Head: Normocephalic and atraumatic.      Right Ear: Hearing, tympanic membrane, ear canal and external ear normal.      Left Ear: Hearing, tympanic membrane, ear canal and external ear normal.      Nose: Nose normal.      Mouth/Throat:      Mouth: Mucous membranes are moist. No oral lesions.      Tongue: No lesions.      Pharynx: Oropharynx is clear. Uvula midline. No pharyngeal " swelling, oropharyngeal exudate, posterior oropharyngeal erythema or uvula swelling.   Eyes:      General: Lids are normal. No scleral icterus.        Right eye: No discharge.         Left eye: No discharge.      Extraocular Movements: Extraocular movements intact.      Conjunctiva/sclera: Conjunctivae normal.      Pupils: Pupils are equal, round, and reactive to light.   Neck:      Musculoskeletal: Full passive range of motion without pain, normal range of motion and neck supple. Normal range of motion. No neck rigidity or pain with movement.      Thyroid: No thyroid mass or thyromegaly.      Vascular: No carotid bruit.   Cardiovascular:      Rate and Rhythm: Normal rate and regular rhythm.      Pulses:           Radial pulses are 2+ on the right side and 2+ on the left side.        Dorsalis pedis pulses are 2+ on the right side and 2+ on the left side.        Posterior tibial pulses are 2+ on the right side and 2+ on the left side.      Heart sounds: Normal heart sounds, S1 normal and S2 normal. No murmur. No friction rub. No gallop.       Comments: Varicose veins in B lower legs.   Pulmonary:      Effort: Pulmonary effort is normal. No respiratory distress.      Breath sounds: Normal breath sounds. No wheezing, rhonchi or rales.   Abdominal:      General: Bowel sounds are normal. There is no distension.      Palpations: Abdomen is soft. There is no mass.      Tenderness: There is no abdominal tenderness. There is no guarding or rebound.   Genitourinary:     Prostate: Normal. Not enlarged and not tender.      Rectum: Normal. No external hemorrhoid. Normal anal tone.   Musculoskeletal: Normal range of motion.         General: No deformity.      Right shoulder: He exhibits normal range of motion, no tenderness, no bony tenderness, no crepitus and no pain.      Left shoulder: He exhibits normal range of motion, no tenderness, no bony tenderness, no crepitus and no pain.      Right lower leg: No edema (trace at ankle,  non-pitting, varicosities noted').      Left lower leg: No edema.   Lymphadenopathy:      Cervical: No cervical adenopathy.      Right cervical: No superficial, deep or posterior cervical adenopathy.     Left cervical: No superficial, deep or posterior cervical adenopathy.      Upper Body:      Right upper body: No supraclavicular, axillary or pectoral adenopathy.      Left upper body: No supraclavicular, axillary or pectoral adenopathy.   Skin:     General: Skin is warm and dry.      Findings: No rash.   Neurological:      Mental Status: He is alert and oriented to person, place, and time.      Cranial Nerves: No cranial nerve deficit.      Sensory: No sensory deficit.      Motor: No weakness, tremor, atrophy or abnormal muscle tone.      Gait: Gait normal.      Deep Tendon Reflexes: Reflexes are normal and symmetric.      Reflex Scores:       Tricep reflexes are 2+ on the right side and 2+ on the left side.       Patellar reflexes are 2+ on the right side and 2+ on the left side.       Achilles reflexes are 2+ on the right side and 2+ on the left side.  Psychiatric:         Attention and Perception: Attention normal.         Mood and Affect: Mood normal.         Speech: Speech normal.         Behavior: Behavior normal. Behavior is cooperative.         Thought Content: Thought content normal.         Assessment/ Plan  Diagnoses and all orders for this visit:    Healthcare maintenance    Need for influenza vaccination  -     Fluarix Quad >6 Months (2410-5643)    IFG (impaired fasting glucose)    Rupture quadriceps tendon, right, subsequent encounter    Dyslipidemia    Family history of colon cancer    Other insomnia    Asymptomatic varicose veins of both lower extremities    Other orders  -     Zinc Sulfate (ZINC-220 PO); Take  by mouth.  -     Probiotic Product (4X PROBIOTIC PO); Take  by mouth.        Return in about 1 year (around 10/6/2021) for Annual physical.      Discussion:  Jose Manuel Vasquez is a 60 y.o. male   RTC in yearly CPE, review of medical issues:   1. Quadricep tear on R - s/p repair with Dr. Lantigua 10/2017. Very mild swelling R ankle, suspect post surgical venous/ lymph flow alteration. Reassured.   2. Tibial tendon strain - dx on MRI, per Dr. Lomax.   3. IFG -  Excellent exercise, biking 12-15 miles QOD. Gym 2-3 x/ week. A1C improved along with FBS today, noted improved diet. Still with dietary indiscretions, advised modification.  Trend numbers.   4. Hx of MRSA - had issue in 2013, resolved.   5. Dyslipidemia - borderline elevated LDL. 10 yr CR 6.8%.  hsCRP normal in 9/2018, will hold statin addition as not indicated. Pt taking ASA daily, OK to stop with no indication/ risk v benefit.  Reviewed with pt.   6. Mild adjustment d/o - noted with stresses of 2020.  Feels like managing with exercise, considering adding hot yoga. I encouraged addition.   7. HM - labs d/w pt; Flu - today; Tdap/ Hep A #1 UTD, declines Hep A #2; Shingrix at pharmacy in 2021, one year after 1/2020 shingles event; C-scope planned 12/2/20; QUANG OK,  PSA OK on trend today; Hep C Ab screen (-) 9/2018; c/w exercise with goal of some weight loss.

## 2020-11-16 ENCOUNTER — TRANSCRIBE ORDERS (OUTPATIENT)
Dept: SLEEP MEDICINE | Facility: HOSPITAL | Age: 60
End: 2020-11-16

## 2020-11-16 DIAGNOSIS — Z01.818 OTHER SPECIFIED PRE-OPERATIVE EXAMINATION: Primary | ICD-10-CM

## 2020-11-20 PROBLEM — Z12.11 SCREEN FOR COLON CANCER: Status: ACTIVE | Noted: 2020-09-17

## 2020-11-30 ENCOUNTER — LAB (OUTPATIENT)
Dept: LAB | Facility: HOSPITAL | Age: 60
End: 2020-11-30

## 2020-11-30 DIAGNOSIS — Z01.818 OTHER SPECIFIED PRE-OPERATIVE EXAMINATION: ICD-10-CM

## 2020-11-30 PROCEDURE — C9803 HOPD COVID-19 SPEC COLLECT: HCPCS

## 2020-11-30 PROCEDURE — U0004 COV-19 TEST NON-CDC HGH THRU: HCPCS

## 2020-12-01 LAB — SARS-COV-2 RNA RESP QL NAA+PROBE: NOT DETECTED

## 2020-12-02 ENCOUNTER — HOSPITAL ENCOUNTER (OUTPATIENT)
Facility: HOSPITAL | Age: 60
Setting detail: HOSPITAL OUTPATIENT SURGERY
Discharge: HOME | End: 2020-12-02
Attending: SURGERY | Admitting: SURGERY

## 2020-12-02 ENCOUNTER — ANESTHESIA EVENT (OUTPATIENT)
Dept: GASTROENTEROLOGY | Facility: HOSPITAL | Age: 60
End: 2020-12-02

## 2020-12-02 ENCOUNTER — ANESTHESIA (OUTPATIENT)
Dept: GASTROENTEROLOGY | Facility: HOSPITAL | Age: 60
End: 2020-12-02

## 2020-12-02 VITALS
OXYGEN SATURATION: 99 % | HEART RATE: 62 BPM | HEIGHT: 69 IN | BODY MASS INDEX: 33.62 KG/M2 | RESPIRATION RATE: 16 BRPM | WEIGHT: 227 LBS | SYSTOLIC BLOOD PRESSURE: 118 MMHG | DIASTOLIC BLOOD PRESSURE: 93 MMHG

## 2020-12-02 DIAGNOSIS — Z80.0 FAMILY HISTORY OF COLON CANCER: ICD-10-CM

## 2020-12-02 DIAGNOSIS — Z12.11 SCREEN FOR COLON CANCER: ICD-10-CM

## 2020-12-02 PROCEDURE — 25010000002 PROPOFOL 10 MG/ML EMULSION: Performed by: ANESTHESIOLOGY

## 2020-12-02 PROCEDURE — S0260 H&P FOR SURGERY: HCPCS | Performed by: SURGERY

## 2020-12-02 PROCEDURE — 45385 COLONOSCOPY W/LESION REMOVAL: CPT | Performed by: SURGERY

## 2020-12-02 PROCEDURE — 88305 TISSUE EXAM BY PATHOLOGIST: CPT | Performed by: SURGERY

## 2020-12-02 RX ORDER — SODIUM CHLORIDE, SODIUM LACTATE, POTASSIUM CHLORIDE, CALCIUM CHLORIDE 600; 310; 30; 20 MG/100ML; MG/100ML; MG/100ML; MG/100ML
30 INJECTION, SOLUTION INTRAVENOUS CONTINUOUS PRN
Status: DISCONTINUED | OUTPATIENT
Start: 2020-12-02 | End: 2020-12-02 | Stop reason: HOSPADM

## 2020-12-02 RX ORDER — LIDOCAINE HYDROCHLORIDE 20 MG/ML
INJECTION, SOLUTION INFILTRATION; PERINEURAL AS NEEDED
Status: DISCONTINUED | OUTPATIENT
Start: 2020-12-02 | End: 2020-12-02 | Stop reason: SURG

## 2020-12-02 RX ORDER — PROPOFOL 10 MG/ML
VIAL (ML) INTRAVENOUS AS NEEDED
Status: DISCONTINUED | OUTPATIENT
Start: 2020-12-02 | End: 2020-12-02 | Stop reason: SURG

## 2020-12-02 RX ADMIN — SODIUM CHLORIDE, POTASSIUM CHLORIDE, SODIUM LACTATE AND CALCIUM CHLORIDE 30 ML/HR: 600; 310; 30; 20 INJECTION, SOLUTION INTRAVENOUS at 06:40

## 2020-12-02 RX ADMIN — LIDOCAINE HYDROCHLORIDE 100 MG: 20 INJECTION, SOLUTION INFILTRATION; PERINEURAL at 07:30

## 2020-12-02 RX ADMIN — PROPOFOL 380 MG: 10 INJECTION, EMULSION INTRAVENOUS at 07:31

## 2020-12-02 NOTE — ANESTHESIA PREPROCEDURE EVALUATION
Anesthesia Evaluation     Patient summary reviewed and Nursing notes reviewed   NPO Solid Status: > 8 hours  NPO Liquid Status: > 4 hours           Airway   Mallampati: II  TM distance: >3 FB  Neck ROM: full  no difficulty expected  Dental - normal exam     Pulmonary - normal exam   Cardiovascular - normal exam        Neuro/Psych  GI/Hepatic/Renal/Endo      Musculoskeletal     Abdominal  - normal exam   Substance History      OB/GYN          Other                        Anesthesia Plan    ASA 2     MAC       Anesthetic plan, all risks, benefits, and alternatives have been provided, discussed and informed consent has been obtained with: patient.

## 2020-12-02 NOTE — ANESTHESIA POSTPROCEDURE EVALUATION
"Patient: Jose Manuel Vasquez    Procedure Summary     Date: 12/02/20 Room / Location:  [facility]    Anesthesia Start: 0729 Anesthesia Stop: 0748    Procedure: COLONOSCOPY TO CECUM WITH HOT SNARE POLYPECTOMY (N/A ) Diagnosis:       Family history of colon cancer      Screen for colon cancer      (Family history of colon cancer [Z80.0])      (Screen for colon cancer [Z12.11])    Surgeon: Maikol Santizo MD Provider: Osman Galvan MD    Anesthesia Type: MAC ASA Status: 2          Anesthesia Type: MAC    Vitals  Vitals Value Taken Time   /73 12/02/20 0850   Temp     Pulse 62 12/02/20 0808   Resp 16 12/02/20 0808   SpO2 97 % 12/02/20 0851   Vitals shown include unvalidated device data.        Post Anesthesia Care and Evaluation    Patient location during evaluation: bedside  Patient participation: complete - patient participated  Level of consciousness: awake and alert  Pain management: adequate  Airway patency: patent  Anesthetic complications: No anesthetic complications    Cardiovascular status: acceptable  Respiratory status: acceptable  Hydration status: acceptable    Comments: /93   Pulse 62   Resp 16   Ht 175.3 cm (69\")   Wt 103 kg (227 lb)   SpO2 99%   BMI 33.52 kg/m²       "

## 2020-12-02 NOTE — H&P
HPI: Screening (family history of colon cancer-aunt)    PMH, PSH, MEDS AND ALLERGIES reviewed and reconciled with EPIC    PHYSICAL EXAM:  -  Constitutional:  no acute distress  -  Respiratory:  normal inspiratory effort  -  Cardiovascular: regular rate  -  Gastrointestinal: Soft    ASSESSMENT/PLAN:    Colonoscopy    Maikol Santizo M.D.

## 2020-12-02 NOTE — OP NOTE
PREOPERATIVE DIAGNOSIS:  Screening, family history of colon cancer (maternal aunt)    POSTOPERATIVE DIAGNOSIS AND FINDINGS:  Subcentimeter rectal polyp    PROCEDURE:  Colonoscopy to cecum with snare polypectomy    SURGEON:  Maikol Santizo MD    ANESTHESIA:  MAC    SPECIMEN(S):  Polyp    DESCRIPTION:  In decubitus position digital rectal exam was normal. Colonoscope inserted under direct visualization of lumen to cecum confirmed by visualization of ileocecal valve and appendiceal orifice.    Scope slowly withdrawn circumferentially examining all mucosal surfaces.    Quality of bowel preparation good.    The only mucosal abnormality was in the low rectum and consisted of a slightly less than 1 cm polyp that was completely removed with a hot snare and retrieved.  Good hemostasis noted at the site.    Tolerated well.    RECOMMENDATION FOR FUTURE SURVEILLANCE:  To be determined based on polyp pathology and issued as separate report    Maikol Santizo M.D.

## 2020-12-03 LAB
LAB AP CASE REPORT: NORMAL
PATH REPORT.FINAL DX SPEC: NORMAL
PATH REPORT.GROSS SPEC: NORMAL

## 2020-12-04 ENCOUNTER — DOCUMENTATION (OUTPATIENT)
Dept: SURGERY | Facility: CLINIC | Age: 60
End: 2020-12-04

## 2020-12-04 NOTE — PROGRESS NOTES
ENDOSCOPY FOLLOW UP NOTE    Colonoscopy 12/2/2020    Indication:  Screening (family history of colon cancer in maternal aunt)    Findings (Pathology):  Subcentimeter rectal polyp (tubular adenoma)    Recommendations:  5-year surveillance    Maikol Santizo M.D.

## 2020-12-07 ENCOUNTER — TELEPHONE (OUTPATIENT)
Dept: SURGERY | Facility: CLINIC | Age: 60
End: 2020-12-07

## 2020-12-07 NOTE — TELEPHONE ENCOUNTER
----- Message from Maikol Santizo MD sent at 12/4/2020  1:33 PM EST -----  Please let him know that he had a single benign polyp and 5-year surveillance recommended-put in computer for reminder

## 2021-03-26 ENCOUNTER — BULK ORDERING (OUTPATIENT)
Dept: CASE MANAGEMENT | Facility: OTHER | Age: 61
End: 2021-03-26

## 2021-03-26 DIAGNOSIS — Z23 IMMUNIZATION DUE: ICD-10-CM

## 2021-04-07 ENCOUNTER — OFFICE VISIT (OUTPATIENT)
Dept: INTERNAL MEDICINE | Facility: CLINIC | Age: 61
End: 2021-04-07

## 2021-04-07 VITALS
DIASTOLIC BLOOD PRESSURE: 80 MMHG | BODY MASS INDEX: 34.21 KG/M2 | TEMPERATURE: 97.3 F | WEIGHT: 231 LBS | SYSTOLIC BLOOD PRESSURE: 130 MMHG | HEART RATE: 68 BPM | HEIGHT: 69 IN | OXYGEN SATURATION: 98 %

## 2021-04-07 DIAGNOSIS — R31.29 HEMATURIA, MICROSCOPIC: ICD-10-CM

## 2021-04-07 DIAGNOSIS — R10.32 LEFT LOWER QUADRANT ABDOMINAL PAIN: Primary | ICD-10-CM

## 2021-04-07 DIAGNOSIS — K57.92 DIVERTICULITIS: ICD-10-CM

## 2021-04-07 LAB
BILIRUB BLD-MCNC: NEGATIVE MG/DL
CLARITY, POC: CLEAR
COLOR UR: YELLOW
GLUCOSE UR STRIP-MCNC: NEGATIVE MG/DL
HCT VFR BLDA CALC: 45 % (ref 38–51)
HGB BLDA-MCNC: 14.6 G/DL (ref 12–17)
KETONES UR QL: NEGATIVE
LEUKOCYTE EST, POC: NEGATIVE
LYMPHOCYTES # BLD: 35.1 %
MCH, POC: 30.8
MCHC, POC: 32.4
MCV, POC: 95.3
MONOCYTES # BLD: 5.8 %
NITRITE UR-MCNC: NEGATIVE MG/ML
PH UR: 7 [PH] (ref 5–8)
PLATELET # BLD: 228 10*3/MM3
PMV BLD: 7.8 FL
POC NEUTROPHIL: 59.1 %
PROT UR STRIP-MCNC: NEGATIVE MG/DL
RBC # UR STRIP: ABNORMAL /UL
RBC, POC: 4.73
RDW, POC: 13
SP GR UR: 1.01 (ref 1–1.03)
UROBILINOGEN UR QL: NORMAL
WBC # BLD: 6.9 10*3/UL

## 2021-04-07 PROCEDURE — 81003 URINALYSIS AUTO W/O SCOPE: CPT | Performed by: INTERNAL MEDICINE

## 2021-04-07 PROCEDURE — 85025 COMPLETE CBC W/AUTO DIFF WBC: CPT | Performed by: INTERNAL MEDICINE

## 2021-04-07 PROCEDURE — 99214 OFFICE O/P EST MOD 30 MIN: CPT | Performed by: INTERNAL MEDICINE

## 2021-04-07 RX ORDER — METRONIDAZOLE 500 MG/1
500 TABLET ORAL 3 TIMES DAILY
Qty: 21 TABLET | Refills: 0 | Status: SHIPPED | OUTPATIENT
Start: 2021-04-07 | End: 2021-04-07

## 2021-04-07 RX ORDER — CIPROFLOXACIN 500 MG/1
500 TABLET, FILM COATED ORAL 2 TIMES DAILY
Qty: 14 TABLET | Refills: 0 | Status: SHIPPED | OUTPATIENT
Start: 2021-04-07 | End: 2021-04-07

## 2021-04-07 RX ORDER — AMOXICILLIN AND CLAVULANATE POTASSIUM 875; 125 MG/1; MG/1
1 TABLET, FILM COATED ORAL 2 TIMES DAILY
Qty: 14 TABLET | Refills: 0 | Status: SHIPPED | OUTPATIENT
Start: 2021-04-07 | End: 2021-04-14

## 2021-04-07 NOTE — PROGRESS NOTES
"Abdominal Pain (lower left side)      HPI  Jose Manuel Vasquez is a 60 y.o. male RTC in acute care:   \"Stomach issues again'.  Notes that usually has some pain around belly button when has pain. However in last week noted some constant and dull LLQ pain in abdomen, atypical for him.  Had some pain 'at top of breath'.  No progression. No fevers or chills.  Bowels were unchanged and has daily BM, no change during this pain.  However, over the weekend had some popcorn and by Sunday pain was worse.  Pain 'zapped all my energy'.  Was really lethargic on Sunday.  However but Monday and since has really felt well and pain resolved. Energy better.    Worried well about diverticulitis.  Felt like looking into it sx were classic.    Feels 'great' today.        Review of Systems   Constitutional: Negative for chills and fever.   HENT: Negative for sore throat.    Skin: Negative for rash and suspicious lesions.   Gastrointestinal: Positive for abdominal pain (resolved in last 3 days. ). Negative for bloating, change in bowel habit, dysphagia, heartburn, nausea and vomiting.   Genitourinary: Positive for frequency (when had issues with LLQ pain.  ; resolved). Negative for dysuria, hematuria and hesitancy.        Felt like pain was 'pushing on my bladder'.          The following portions of the patient's history were reviewed and updated as appropriate: allergies, current medications, past medical history, past social history and problem list.      Current Outpatient Medications:   •  Probiotic Product (4X PROBIOTIC PO), Take  by mouth., Disp: , Rfl:   •  Zinc Sulfate (ZINC-220 PO), Take  by mouth., Disp: , Rfl:   •  ciprofloxacin (Cipro) 500 MG tablet, Take 1 tablet by mouth 2 (Two) Times a Day for 7 days., Disp: 14 tablet, Rfl: 0  •  metroNIDAZOLE (Flagyl) 500 MG tablet, Take 1 tablet by mouth 3 (Three) Times a Day for 7 days., Disp: 21 tablet, Rfl: 0    Vitals:    04/07/21 0951   BP: 130/80   Pulse: 68   Temp: 97.3 °F (36.3 °C) " "  SpO2: 98%   Weight: 105 kg (231 lb)   Height: 175.3 cm (69\")     Body mass index is 34.11 kg/m².      Physical Exam  Vitals reviewed.   Constitutional:       General: He is not in acute distress.     Appearance: He is well-developed. He is not ill-appearing or toxic-appearing.   HENT:      Head: Normocephalic and atraumatic.      Mouth/Throat:      Mouth: No oral lesions.      Tongue: No lesions.      Pharynx: No pharyngeal swelling or uvula swelling.   Eyes:      General: No scleral icterus.     Conjunctiva/sclera: Conjunctivae normal.   Neck:      Vascular: No carotid bruit.   Cardiovascular:      Rate and Rhythm: Normal rate and regular rhythm.      Pulses:           Carotid pulses are 2+ on the right side and 2+ on the left side.       Radial pulses are 2+ on the right side and 2+ on the left side.      Heart sounds: Normal heart sounds.   Pulmonary:      Effort: Pulmonary effort is normal. No respiratory distress.   Abdominal:      General: Abdomen is flat. Bowel sounds are normal. There is no distension.      Palpations: Abdomen is soft. There is no mass.      Tenderness: There is abdominal tenderness (mild, on deep palpation, no rebound) in the left lower quadrant. There is no guarding or rebound. Negative signs include Zuñiga's sign.      Hernia: A hernia is present. Hernia is present in the umbilical area (soft, reducible).   Musculoskeletal:      Cervical back: Normal range of motion and neck supple. No muscular tenderness.   Lymphadenopathy:      Cervical: No cervical adenopathy.   Skin:     Findings: No rash (over abdomen or torso).   Neurological:      Mental Status: He is alert and oriented to person, place, and time.      Cranial Nerves: No cranial nerve deficit.      Gait: Gait normal.   Psychiatric:         Attention and Perception: Attention normal.         Mood and Affect: Mood and affect normal.         Behavior: Behavior normal.         Thought Content: Thought content normal. "           Results for orders placed or performed in visit on 04/07/21   POC Urinalysis Dipstick, Automated    Specimen: Urine   Result Value Ref Range    Color Yellow Yellow, Straw, Dark Yellow, Liza    Clarity, UA Clear Clear    Specific Gravity  1.015 1.005 - 1.030    pH, Urine 7.0 5.0 - 8.0    Leukocytes Negative Negative    Nitrite, UA Negative Negative    Protein, POC Negative Negative mg/dL    Glucose, UA Negative Negative, 1000 mg/dL (3+) mg/dL    Ketones, UA Negative Negative    Urobilinogen, UA Normal Normal    Bilirubin Negative Negative    Blood, UA Trace (A) Negative   POC CBC With / Auto Diff    Specimen: Blood   Result Value Ref Range    WBC 6.9     RBC 4.73     Hemoglobin 14.6 12.0 - 17.0 g/dL    Hematocrit 45.0 38 - 51 %    MCV 95.3     MCH 30.8     MCHC 32.4     RDW-CV 13.0     MPV 7.8     Platelets 228 10*3/mm3    Neutrophil Rel % 59.1 %    Monocyte Rel % 5.8 %    Lymphocyte Rel % 35.1 %       Assessment/ Plan  Diagnoses and all orders for this visit:    Left lower quadrant abdominal pain  -     POC Urinalysis Dipstick, Automated  -     ciprofloxacin (Cipro) 500 MG tablet; Take 1 tablet by mouth 2 (Two) Times a Day for 7 days.  -     metroNIDAZOLE (Flagyl) 500 MG tablet; Take 1 tablet by mouth 3 (Three) Times a Day for 7 days.  -     POC CBC With / Auto Diff    Diverticulitis  -     ciprofloxacin (Cipro) 500 MG tablet; Take 1 tablet by mouth 2 (Two) Times a Day for 7 days.  -     metroNIDAZOLE (Flagyl) 500 MG tablet; Take 1 tablet by mouth 3 (Three) Times a Day for 7 days.    Hematuria, microscopic  -     UA / M With / Rflx Culture([facility] ONLY) - Urine, Clean Catch        Return in about 10 days (around 4/17/2021).      Discussion:  Jose Manuel Vasquez is a 60 y.o. male with recent (-) C-scope in 12/2020 RTC In acute care with ~1 week of LLQ abdominal pain and mild fatigue issues. No other associated sx.  Improved in last few days, but exam still with TTP in LLQ to deep palpation. Udip clear other than  trace blood, will send out microscopy (doubt renal/ stone issue). No leukocytosis noted.  Hx is convincing for diverticulitis and pt and I both prefer to avoid unnecessary CT scans. Will tx empirically today for diverticulitis and reexamine abdomen in 10 days. Reviewed med side effects including tendon rupturew with FQ's with pt today.  Call if any issues.     Addendum: Reviewed history and pt with quad tendon tear in past.  I think risk of Cipro outweighs benefit. Will D/C Cipro/ Flagyl and change to Augmentin 875 BID x 7 days. MA cancelled dual Abx at pharmacy. Reviewed plan with pt via phone on 04/07/21

## 2021-04-08 LAB
APPEARANCE UR: CLEAR
BACTERIA #/AREA URNS HPF: NORMAL /HPF
BILIRUB UR QL STRIP: NEGATIVE
COLOR UR: YELLOW
EPI CELLS #/AREA URNS HPF: NORMAL /HPF (ref 0–10)
GLUCOSE UR QL: NEGATIVE
HGB UR QL STRIP: NEGATIVE
KETONES UR QL STRIP: NEGATIVE
LEUKOCYTE ESTERASE UR QL STRIP: NEGATIVE
MICRO URNS: NORMAL
MICRO URNS: NORMAL
NITRITE UR QL STRIP: NEGATIVE
PH UR STRIP: 7 [PH] (ref 5–7.5)
PROT UR QL STRIP: NEGATIVE
RBC #/AREA URNS HPF: NORMAL /HPF (ref 0–2)
SP GR UR: 1.01 (ref 1–1.03)
URINALYSIS REFLEX: NORMAL
UROBILINOGEN UR STRIP-MCNC: 0.2 MG/DL (ref 0.2–1)
WBC #/AREA URNS HPF: NORMAL /HPF (ref 0–5)

## 2021-04-16 ENCOUNTER — OFFICE VISIT (OUTPATIENT)
Dept: INTERNAL MEDICINE | Facility: CLINIC | Age: 61
End: 2021-04-16

## 2021-04-16 VITALS
SYSTOLIC BLOOD PRESSURE: 120 MMHG | TEMPERATURE: 97.1 F | DIASTOLIC BLOOD PRESSURE: 90 MMHG | WEIGHT: 233 LBS | BODY MASS INDEX: 34.51 KG/M2 | OXYGEN SATURATION: 96 % | HEIGHT: 69 IN | HEART RATE: 66 BPM

## 2021-04-16 DIAGNOSIS — R10.32 LEFT LOWER QUADRANT ABDOMINAL PAIN: Primary | ICD-10-CM

## 2021-04-16 DIAGNOSIS — R53.83 FATIGUE, UNSPECIFIED TYPE: ICD-10-CM

## 2021-04-16 PROBLEM — Z12.11 SCREEN FOR COLON CANCER: Status: RESOLVED | Noted: 2020-09-17 | Resolved: 2021-04-16

## 2021-04-16 PROCEDURE — 99212 OFFICE O/P EST SF 10 MIN: CPT | Performed by: INTERNAL MEDICINE

## 2021-04-16 NOTE — PROGRESS NOTES
"Follow-up (abdomen pain)      HPI  Jose Manuel Vasquez is a 60 y.o. male RTC In short interval f/u:  Started on Abx after last visit with empriric tx of Jomar . Had some up and down days.  Had good and bad days.  One day had to go home from work.  Then other days felt good enough to go to gym.   'I dont know what to make of it\" No fevers, but 'kind of grayish in color'.  Pain during the days when felt bad.  On good days, no pain at all.    Today is feeling well.   No pain today.   Completed Abx yesterday afternoon.   Notes diet is in check. Eating well with son who is  and sharing overall healthy diet plan.     Review of Systems   Constitutional: Positive for malaise/fatigue (variable). Negative for chills and fever.   Cardiovascular: Negative for dyspnea on exertion.   Respiratory: Negative for shortness of breath.    Hematologic/Lymphatic: Negative for bleeding problem.   Skin: Negative for rash and suspicious lesions.   Gastrointestinal: Positive for abdominal pain (variable, DASHA today. ). Negative for change in bowel habit (had BM today. Baseline looser stool. ), constipation, diarrhea, nausea and vomiting.   Genitourinary: Positive for frequency (variable, seems worse with pain). Negative for dysuria and hematuria.       The following portions of the patient's history were reviewed and updated as appropriate: allergies, current medications, past medical history, past social history and problem list.      Current Outpatient Medications:   •  Ascorbic Acid (VITAMIN C PO), Take  by mouth., Disp: , Rfl:   •  Probiotic Product (4X PROBIOTIC PO), Take  by mouth., Disp: , Rfl:   •  Zinc Sulfate (ZINC-220 PO), Take  by mouth., Disp: , Rfl:     Vitals:    04/16/21 1107   BP: 120/90   Pulse: 66   Temp: 97.1 °F (36.2 °C)   SpO2: 96%   Weight: 106 kg (233 lb)   Height: 175.3 cm (69\")     Body mass index is 34.41 kg/m².      Physical Exam  Vitals reviewed.   Constitutional:       General: He is not in acute " distress.     Appearance: He is well-developed. He is not ill-appearing or toxic-appearing.   HENT:      Head: Normocephalic.   Eyes:      General: No scleral icterus.  Pulmonary:      Effort: Pulmonary effort is normal. No respiratory distress.   Abdominal:      General: Abdomen is flat. Bowel sounds are normal. There is no distension.      Palpations: There is no splenomegaly or mass.      Tenderness: There is no abdominal tenderness. There is no guarding or rebound.      Hernia: No hernia is present.   Skin:     Findings: No rash (over torso/ abdomen on L).   Neurological:      Mental Status: He is alert and oriented to person, place, and time.      Gait: Gait normal.   Psychiatric:         Mood and Affect: Mood normal.         Behavior: Behavior normal.         Thought Content: Thought content normal.         Assessment/ Plan  Diagnoses and all orders for this visit:    Left lower quadrant abdominal pain    Fatigue, unspecified type    Other orders  -     Ascorbic Acid (VITAMIN C PO); Take  by mouth.        No follow-ups on file.      Discussion:  Jose Manuel Vasquez is a 60 y.o. male s/p (-) C-scope in 12/2020 RTC In short interval f/u after recent visit ~10 days ago with empiric tx with Augmentin for LLQ pain, diverticulitis. Pt notes pain and sx have been highly variable in last 10 days and are resolved today.  Exam is benign.  Recall U/A clear and CBC OK at last visit.  I reassured pt, but we did discuss next step is CT A/P if sx return now that completed Abx. Pt and I had long talk about Ddx and best plan here. Pt agreeable for watchful waiting and will call if any return of pain sx and will move toward CT.  Otherwise, RTC as planned.

## 2021-05-19 ENCOUNTER — OFFICE VISIT (OUTPATIENT)
Dept: INTERNAL MEDICINE | Facility: CLINIC | Age: 61
End: 2021-05-19

## 2021-05-19 VITALS
RESPIRATION RATE: 18 BRPM | WEIGHT: 231 LBS | HEIGHT: 69 IN | HEART RATE: 63 BPM | OXYGEN SATURATION: 98 % | DIASTOLIC BLOOD PRESSURE: 86 MMHG | BODY MASS INDEX: 34.21 KG/M2 | TEMPERATURE: 97.3 F | SYSTOLIC BLOOD PRESSURE: 128 MMHG

## 2021-05-19 DIAGNOSIS — J01.90 ACUTE RHINOSINUSITIS: Primary | ICD-10-CM

## 2021-05-19 DIAGNOSIS — J03.90 TONSILLITIS: ICD-10-CM

## 2021-05-19 LAB
EXPIRATION DATE: NORMAL
INTERNAL CONTROL: NORMAL
Lab: NORMAL
S PYO AG THROAT QL: NEGATIVE

## 2021-05-19 PROCEDURE — 99213 OFFICE O/P EST LOW 20 MIN: CPT | Performed by: NURSE PRACTITIONER

## 2021-05-19 PROCEDURE — 87880 STREP A ASSAY W/OPTIC: CPT | Performed by: NURSE PRACTITIONER

## 2021-05-19 RX ORDER — CEFUROXIME AXETIL 250 MG/1
250 TABLET ORAL EVERY 12 HOURS
Qty: 20 TABLET | Refills: 0 | Status: SHIPPED | OUTPATIENT
Start: 2021-05-19 | End: 2022-11-10

## 2021-05-19 RX ORDER — PREDNISONE 20 MG/1
60 TABLET ORAL DAILY
Qty: 9 TABLET | Refills: 0 | Status: SHIPPED | OUTPATIENT
Start: 2021-05-19 | End: 2021-05-22

## 2021-05-19 NOTE — PROGRESS NOTES
Subjective   Jose Manuel Vasquez is a 60 y.o. male.   Chief Complaint   Patient presents with   • Sore Throat   • Cough     Vitals:    05/19/21 1258   BP: 128/86   Pulse: 63   Resp: 18   Temp: 97.3 °F (36.3 °C)   SpO2: 98%     No LMP for male patient.    Jose Manuel is a 60 year old male patient of Dr Head who is here for an acute visit     Sore Throat   This is a new problem. Episode onset: 10 days  The problem has been gradually worsening. There has been no fever. The pain is moderate. Associated symptoms include congestion, coughing and trouble swallowing. Pertinent negatives include no shortness of breath. He has had no exposure to strep. He has tried NSAIDs for the symptoms. The treatment provided no relief.        The following portions of the patient's history were reviewed and updated as appropriate: allergies, current medications, past family history, past medical history, past social history, past surgical history and problem list.    Review of Systems   Constitutional: Negative for fever.   HENT: Positive for congestion, sore throat and trouble swallowing.    Respiratory: Positive for cough. Negative for shortness of breath.    Cardiovascular: Negative.    Gastrointestinal: Negative.    Psychiatric/Behavioral: Positive for sleep disturbance (sore throat is worse at night and waking him up ).       Objective   Physical Exam  Vitals and nursing note reviewed.   Constitutional:       General: He is not in acute distress.     Appearance: He is well-developed.   HENT:      Head: Normocephalic.      Nose: Mucosal edema and rhinorrhea (clear on right, purulent on left ) present.      Right Turbinates: Swollen.      Left Turbinates: Swollen.      Mouth/Throat:      Pharynx: Pharyngeal swelling and posterior oropharyngeal erythema present. No oropharyngeal exudate.      Tonsils: 3+ on the right. 3+ on the left.   Skin:     General: Skin is warm and dry.   Neurological:      Mental Status: He is alert.         Assessment/Plan    Diagnoses and all orders for this visit:    1. Acute rhinosinusitis (Primary)  -     POC Rapid Strep A    2. Tonsillitis  -     POC Rapid Strep A    Other orders  -     cefuroxime (CEFTIN) 250 MG tablet; Take 1 tablet by mouth Every 12 (Twelve) Hours.  Dispense: 20 tablet; Refill: 0  -     predniSONE (DELTASONE) 20 MG tablet; Take 3 tablets by mouth Daily for 3 days.  Dispense: 9 tablet; Refill: 0      Strep is negative  Start ceftin and prednisone  Salt water gargles as needed   Cold liquids  Tylenol or motrin as needed  Follow up if your symptoms persist, worsen or if new symptoms develop

## 2021-05-19 NOTE — PATIENT INSTRUCTIONS
Tonsillitis    Tonsillitis is an infection of the throat. This infection causes the tonsils to become red, tender, and swollen. Tonsils are tissues in the back of your throat. If bacteria caused your infection, antibiotic medicine will be given to you. Sometimes, symptoms of this infection can be treated with the use of medicines that lessen swelling (steroids). If your tonsillitis is very bad (severe) and happens often, you may need to get your tonsils removed (tonsillectomy).  Follow these instructions at home:  Medicines  · Take over-the-counter and prescription medicines only as told by your doctor.  · If you were prescribed an antibiotic, take it as told by your doctor. Do not stop taking the antibiotic even if you start to feel better.  Eating and drinking  · Drink enough fluid to keep your pee (urine) clear or pale yellow.  · While your throat is sore, eat soft or liquid foods like:  ? Soup.  ? Sherbert.  ? Instant breakfast drinks.  · Drink warm fluids.  · Eat frozen ice pops.  General instructions  · Rest as much as possible and get plenty of sleep.  · Gargle with a salt-water mixture 3-4 times a day or as needed. To make a salt-water mixture, completely dissolve ½-1 tsp of salt in 1 cup of warm water.  · Wash your hands often with soap and water. If there is no soap and water, use hand .  · Do not share cups, bottles, or other utensils until your symptoms are gone.  · Do not smoke. If you need help quitting, ask your doctor.  · Keep all follow-up visits as told by your doctor. This is important.  Contact a doctor if:  · You have large, tender lumps in your neck.  · You have a fever that does not go away after 2-3 days.  · You have a rash.  · You cough up green, yellow-brown, or bloody fluid.  · You cannot swallow liquids or food for 24 hours.  · Only one of your tonsils is swollen.  Get help right away if:  · You have any new symptoms such as:  ? Vomiting.  ? Very bad headache.  ? Stiff  neck.  ? Chest pain.  ? Trouble breathing or swallowing.  · You have very bad throat pain and you also have drooling or voice changes.  · You have very bad pain that is not helped by medicine.  · You cannot fully open your mouth.  · You have redness, swelling, or severe pain anywhere in your neck.  Summary  · Tonsillitis causes your tonsils to be red, tender, and swollen.  · While your throat is sore, eat soft or liquid foods.  · Gargle with a salt-water mixture 3-4 times a day or as needed.  · Do not share cups, bottles, or other utensils until your symptoms are gone.  This information is not intended to replace advice given to you by your health care provider. Make sure you discuss any questions you have with your health care provider.  Document Revised: 11/30/2018 Document Reviewed: 01/23/2018  Elsevier Patient Education © 2021 [employer]

## 2021-10-13 DIAGNOSIS — R73.01 IFG (IMPAIRED FASTING GLUCOSE): ICD-10-CM

## 2021-10-13 DIAGNOSIS — Z00.00 HEALTHCARE MAINTENANCE: Primary | ICD-10-CM

## 2021-10-13 DIAGNOSIS — E78.5 DYSLIPIDEMIA: ICD-10-CM

## 2021-10-13 DIAGNOSIS — Z12.5 SCREENING FOR PROSTATE CANCER: ICD-10-CM

## 2021-10-13 DIAGNOSIS — E73.9 LACTOSE INTOLERANCE: ICD-10-CM

## 2021-10-23 LAB
ALBUMIN SERPL-MCNC: 4.3 G/DL (ref 3.8–4.9)
ALBUMIN/GLOB SERPL: 1.9 {RATIO} (ref 1.2–2.2)
ALP SERPL-CCNC: 97 IU/L (ref 44–121)
ALT SERPL-CCNC: 19 IU/L (ref 0–44)
APPEARANCE UR: CLEAR
AST SERPL-CCNC: 24 IU/L (ref 0–40)
BACTERIA #/AREA URNS HPF: NORMAL /[HPF]
BASOPHILS # BLD AUTO: 0.1 X10E3/UL (ref 0–0.2)
BASOPHILS NFR BLD AUTO: 1 %
BILIRUB SERPL-MCNC: 0.7 MG/DL (ref 0–1.2)
BILIRUB UR QL STRIP: NEGATIVE
BUN SERPL-MCNC: 26 MG/DL (ref 6–24)
BUN/CREAT SERPL: 21 (ref 9–20)
CALCIUM SERPL-MCNC: 9.6 MG/DL (ref 8.7–10.2)
CASTS URNS QL MICRO: NORMAL /LPF
CHLORIDE SERPL-SCNC: 98 MMOL/L (ref 96–106)
CHOLEST SERPL-MCNC: 160 MG/DL (ref 100–199)
CO2 SERPL-SCNC: 23 MMOL/L (ref 20–29)
COLOR UR: YELLOW
CREAT SERPL-MCNC: 1.24 MG/DL (ref 0.76–1.27)
EOSINOPHIL # BLD AUTO: 0.4 X10E3/UL (ref 0–0.4)
EOSINOPHIL NFR BLD AUTO: 7 %
EPI CELLS #/AREA URNS HPF: NORMAL /HPF (ref 0–10)
ERYTHROCYTE [DISTWIDTH] IN BLOOD BY AUTOMATED COUNT: 12.5 % (ref 11.6–15.4)
GLOBULIN SER CALC-MCNC: 2.3 G/DL (ref 1.5–4.5)
GLUCOSE SERPL-MCNC: 97 MG/DL (ref 65–99)
GLUCOSE UR QL: NEGATIVE
HBA1C MFR BLD: 5.8 % (ref 4.8–5.6)
HCT VFR BLD AUTO: 45.4 % (ref 37.5–51)
HDLC SERPL-MCNC: 54 MG/DL
HGB BLD-MCNC: 15.1 G/DL (ref 13–17.7)
HGB UR QL STRIP: NEGATIVE
IMM GRANULOCYTES # BLD AUTO: 0 X10E3/UL (ref 0–0.1)
IMM GRANULOCYTES NFR BLD AUTO: 0 %
KETONES UR QL STRIP: NEGATIVE
LDLC SERPL CALC-MCNC: 92 MG/DL (ref 0–99)
LEUKOCYTE ESTERASE UR QL STRIP: NEGATIVE
LYMPHOCYTES # BLD AUTO: 1.6 X10E3/UL (ref 0.7–3.1)
LYMPHOCYTES NFR BLD AUTO: 24 %
MCH RBC QN AUTO: 31.7 PG (ref 26.6–33)
MCHC RBC AUTO-ENTMCNC: 33.3 G/DL (ref 31.5–35.7)
MCV RBC AUTO: 95 FL (ref 79–97)
MICRO URNS: NORMAL
MICRO URNS: NORMAL
MONOCYTES # BLD AUTO: 0.7 X10E3/UL (ref 0.1–0.9)
MONOCYTES NFR BLD AUTO: 11 %
NEUTROPHILS # BLD AUTO: 3.9 X10E3/UL (ref 1.4–7)
NEUTROPHILS NFR BLD AUTO: 57 %
NITRITE UR QL STRIP: NEGATIVE
PH UR STRIP: 6.5 [PH] (ref 5–7.5)
PLATELET # BLD AUTO: 236 X10E3/UL (ref 150–450)
POTASSIUM SERPL-SCNC: 4.9 MMOL/L (ref 3.5–5.2)
PROT SERPL-MCNC: 6.6 G/DL (ref 6–8.5)
PROT UR QL STRIP: NEGATIVE
PSA SERPL-MCNC: 0.9 NG/ML (ref 0–4)
RBC # BLD AUTO: 4.77 X10E6/UL (ref 4.14–5.8)
RBC #/AREA URNS HPF: NORMAL /HPF (ref 0–2)
SODIUM SERPL-SCNC: 135 MMOL/L (ref 134–144)
SP GR UR: 1.01 (ref 1–1.03)
TRIGL SERPL-MCNC: 73 MG/DL (ref 0–149)
TSH SERPL DL<=0.005 MIU/L-ACNC: 1.34 UIU/ML (ref 0.45–4.5)
URINALYSIS REFLEX: NORMAL
UROBILINOGEN UR STRIP-MCNC: 0.2 MG/DL (ref 0.2–1)
VLDLC SERPL CALC-MCNC: 14 MG/DL (ref 5–40)
WBC # BLD AUTO: 6.8 X10E3/UL (ref 3.4–10.8)
WBC #/AREA URNS HPF: NORMAL /HPF (ref 0–5)

## 2021-10-25 ENCOUNTER — TELEPHONE (OUTPATIENT)
Dept: INTERNAL MEDICINE | Facility: CLINIC | Age: 61
End: 2021-10-25

## 2021-10-25 NOTE — TELEPHONE ENCOUNTER
Caller: Jose Manuel Vasquez    Relationship: Self    Best call back number: [phone] (H)    What orders are you requesting (i.e. lab or imaging): CPE LABS     In what timeframe would the patient need to come in: 1 WEEK PRIOR TO PHYSICAL ON 01/12/21     Where will you receive your lab/imaging services: PATIENT CALLED IN TO RESCHEDULE HIS PHYSICAL THAT WAS SET FOR 10/28/21. HE JUST HAD THE LABS DONE 10/22/21. WE RESCHEDULED FOR 01/12/21- AND HE WILL NEEDS LABS DONE AGAIN A WEEK PRIOR.     Additional notes:     PLEASE CALL PATIENT ONCE ORDERS ARE ENTERED FOR SCHEDULING PURPOSES.

## 2021-10-25 NOTE — PROGRESS NOTES
Labs reviewed, will discuss at upcoming appt.  Detail Level: Generalized Include Location In Plan?: Yes

## 2022-08-23 ENCOUNTER — TELEPHONE (OUTPATIENT)
Dept: INTERNAL MEDICINE | Facility: CLINIC | Age: 62
End: 2022-08-23

## 2022-08-23 NOTE — TELEPHONE ENCOUNTER
Caller: Jose Manuel Vasquez    Relationship to patient: Self    Best call back number: [phone]     Chief complaint: PHYSICAL    Type of visit: PHYSICAL    Requested date: ANY DAY      Additional notes: PATIENT IS IN NEED OF A PHYSICAL BUT CAN ONLY COME IN AFTER 1 PM.    PLEASE CONTACT PATIENT WITH APPOINTMENT DATE AS SOON AS POSSIBLE.

## 2022-10-05 ENCOUNTER — TELEPHONE (OUTPATIENT)
Dept: INTERNAL MEDICINE | Facility: CLINIC | Age: 62
End: 2022-10-05

## 2022-10-05 NOTE — TELEPHONE ENCOUNTER
Caller: Jose Manuel Vasquez    Relationship: Self    Best call back number: *[phone] (H)        What was the call regarding:  PATIENT WANTS A CALL WHEN THE FLU VACCINES ARE AVAILABLE     Do you require a callback: YES PLEASE

## 2022-11-03 DIAGNOSIS — Z00.00 HEALTHCARE MAINTENANCE: Primary | ICD-10-CM

## 2022-11-03 DIAGNOSIS — Z12.5 SCREENING FOR PROSTATE CANCER: ICD-10-CM

## 2022-11-03 DIAGNOSIS — R73.01 IFG (IMPAIRED FASTING GLUCOSE): ICD-10-CM

## 2022-11-04 LAB
ALBUMIN SERPL-MCNC: 4.8 G/DL (ref 3.5–5.2)
ALBUMIN/GLOB SERPL: 2.3 G/DL
ALP SERPL-CCNC: 75 U/L (ref 39–117)
ALT SERPL-CCNC: 16 U/L (ref 1–41)
APPEARANCE UR: CLEAR
AST SERPL-CCNC: 26 U/L (ref 1–40)
BACTERIA #/AREA URNS HPF: NORMAL /HPF
BASOPHILS # BLD AUTO: 0.07 10*3/MM3 (ref 0–0.2)
BASOPHILS NFR BLD AUTO: 1 % (ref 0–1.5)
BILIRUB SERPL-MCNC: 1.1 MG/DL (ref 0–1.2)
BILIRUB UR QL STRIP: NEGATIVE
BUN SERPL-MCNC: 17 MG/DL (ref 8–23)
BUN/CREAT SERPL: 15.5 (ref 7–25)
CALCIUM SERPL-MCNC: 10 MG/DL (ref 8.6–10.5)
CASTS URNS QL MICRO: NORMAL /LPF
CHLORIDE SERPL-SCNC: 101 MMOL/L (ref 98–107)
CHOLEST SERPL-MCNC: 203 MG/DL (ref 0–200)
CO2 SERPL-SCNC: 29 MMOL/L (ref 22–29)
COLOR UR: YELLOW
CREAT SERPL-MCNC: 1.1 MG/DL (ref 0.76–1.27)
EGFRCR SERPLBLD CKD-EPI 2021: 76.9 ML/MIN/1.73
EOSINOPHIL # BLD AUTO: 0.12 10*3/MM3 (ref 0–0.4)
EOSINOPHIL NFR BLD AUTO: 1.8 % (ref 0.3–6.2)
EPI CELLS #/AREA URNS HPF: NORMAL /HPF (ref 0–10)
ERYTHROCYTE [DISTWIDTH] IN BLOOD BY AUTOMATED COUNT: 12.6 % (ref 12.3–15.4)
GLOBULIN SER CALC-MCNC: 2.1 GM/DL
GLUCOSE SERPL-MCNC: 116 MG/DL (ref 65–99)
GLUCOSE UR QL STRIP: NEGATIVE
HBA1C MFR BLD: 5.8 % (ref 4.8–5.6)
HCT VFR BLD AUTO: 44 % (ref 37.5–51)
HDLC SERPL-MCNC: 65 MG/DL (ref 40–60)
HGB BLD-MCNC: 14.8 G/DL (ref 13–17.7)
HGB UR QL STRIP: NEGATIVE
IMM GRANULOCYTES # BLD AUTO: 0.02 10*3/MM3 (ref 0–0.05)
IMM GRANULOCYTES NFR BLD AUTO: 0.3 % (ref 0–0.5)
KETONES UR QL STRIP: NEGATIVE
LDLC SERPL CALC-MCNC: 124 MG/DL (ref 0–100)
LDLC/HDLC SERPL: 1.89 {RATIO}
LEUKOCYTE ESTERASE UR QL STRIP: NEGATIVE
LYMPHOCYTES # BLD AUTO: 2.27 10*3/MM3 (ref 0.7–3.1)
LYMPHOCYTES NFR BLD AUTO: 33.3 % (ref 19.6–45.3)
MCH RBC QN AUTO: 31.8 PG (ref 26.6–33)
MCHC RBC AUTO-ENTMCNC: 33.6 G/DL (ref 31.5–35.7)
MCV RBC AUTO: 94.4 FL (ref 79–97)
MICRO URNS: NORMAL
MICRO URNS: NORMAL
MONOCYTES # BLD AUTO: 0.8 10*3/MM3 (ref 0.1–0.9)
MONOCYTES NFR BLD AUTO: 11.7 % (ref 5–12)
NEUTROPHILS # BLD AUTO: 3.54 10*3/MM3 (ref 1.7–7)
NEUTROPHILS NFR BLD AUTO: 51.9 % (ref 42.7–76)
NITRITE UR QL STRIP: NEGATIVE
NRBC BLD AUTO-RTO: 0 /100 WBC (ref 0–0.2)
PH UR STRIP: 7 [PH] (ref 5–7.5)
PLATELET # BLD AUTO: 245 10*3/MM3 (ref 140–450)
POTASSIUM SERPL-SCNC: 4.9 MMOL/L (ref 3.5–5.2)
PROT SERPL-MCNC: 6.9 G/DL (ref 6–8.5)
PROT UR QL STRIP: NEGATIVE
PSA SERPL-MCNC: 1.1 NG/ML (ref 0–4)
RBC # BLD AUTO: 4.66 10*6/MM3 (ref 4.14–5.8)
RBC #/AREA URNS HPF: NORMAL /HPF (ref 0–2)
SODIUM SERPL-SCNC: 139 MMOL/L (ref 136–145)
SP GR UR STRIP: 1.01 (ref 1–1.03)
TRIGL SERPL-MCNC: 76 MG/DL (ref 0–150)
TSH SERPL DL<=0.005 MIU/L-ACNC: 2.01 UIU/ML (ref 0.27–4.2)
URINALYSIS REFLEX: NORMAL
UROBILINOGEN UR STRIP-MCNC: 0.2 MG/DL (ref 0.2–1)
VLDLC SERPL CALC-MCNC: 14 MG/DL (ref 5–40)
WBC # BLD AUTO: 6.82 10*3/MM3 (ref 3.4–10.8)
WBC #/AREA URNS HPF: NORMAL /HPF (ref 0–5)

## 2022-11-10 ENCOUNTER — OFFICE VISIT (OUTPATIENT)
Dept: INTERNAL MEDICINE | Facility: CLINIC | Age: 62
End: 2022-11-10

## 2022-11-10 VITALS
BODY MASS INDEX: 32.67 KG/M2 | DIASTOLIC BLOOD PRESSURE: 78 MMHG | HEART RATE: 59 BPM | OXYGEN SATURATION: 99 % | WEIGHT: 220.6 LBS | SYSTOLIC BLOOD PRESSURE: 126 MMHG | TEMPERATURE: 97.9 F | HEIGHT: 69 IN

## 2022-11-10 DIAGNOSIS — I83.93 ASYMPTOMATIC VARICOSE VEINS OF BOTH LOWER EXTREMITIES: ICD-10-CM

## 2022-11-10 DIAGNOSIS — Z00.01 ENCOUNTER FOR GENERAL ADULT MEDICAL EXAMINATION WITH ABNORMAL FINDINGS: Primary | ICD-10-CM

## 2022-11-10 DIAGNOSIS — E78.5 DYSLIPIDEMIA: ICD-10-CM

## 2022-11-10 DIAGNOSIS — Z80.0 FAMILY HISTORY OF COLON CANCER: ICD-10-CM

## 2022-11-10 DIAGNOSIS — N39.41 URGE INCONTINENCE OF URINE: ICD-10-CM

## 2022-11-10 DIAGNOSIS — S76.111D RUPTURE QUADRICEPS TENDON, RIGHT, SUBSEQUENT ENCOUNTER: ICD-10-CM

## 2022-11-10 DIAGNOSIS — R73.01 IFG (IMPAIRED FASTING GLUCOSE): ICD-10-CM

## 2022-11-10 DIAGNOSIS — Z23 NEEDS FLU SHOT: ICD-10-CM

## 2022-11-10 DIAGNOSIS — S46.212A RUPTURE OF LEFT BICEPS TENDON, INITIAL ENCOUNTER: ICD-10-CM

## 2022-11-10 DIAGNOSIS — E73.9 LACTOSE INTOLERANCE: ICD-10-CM

## 2022-11-10 DIAGNOSIS — S83.241D OTHER TEAR OF MEDIAL MENISCUS OF RIGHT KNEE AS CURRENT INJURY, SUBSEQUENT ENCOUNTER: ICD-10-CM

## 2022-11-10 DIAGNOSIS — D12.6 ADENOMATOUS POLYP OF COLON, UNSPECIFIED PART OF COLON: ICD-10-CM

## 2022-11-10 DIAGNOSIS — R39.15 URINARY URGENCY: ICD-10-CM

## 2022-11-10 PROCEDURE — 99213 OFFICE O/P EST LOW 20 MIN: CPT | Performed by: INTERNAL MEDICINE

## 2022-11-10 PROCEDURE — 90632 HEPA VACCINE ADULT IM: CPT | Performed by: INTERNAL MEDICINE

## 2022-11-10 PROCEDURE — 90471 IMMUNIZATION ADMIN: CPT | Performed by: INTERNAL MEDICINE

## 2022-11-10 PROCEDURE — 90686 IIV4 VACC NO PRSV 0.5 ML IM: CPT | Performed by: INTERNAL MEDICINE

## 2022-11-10 PROCEDURE — 99396 PREV VISIT EST AGE 40-64: CPT | Performed by: INTERNAL MEDICINE

## 2022-11-10 PROCEDURE — 90472 IMMUNIZATION ADMIN EACH ADD: CPT | Performed by: INTERNAL MEDICINE

## 2022-11-10 RX ORDER — ACETYLCYSTEINE 600 MG
CAPSULE ORAL
COMMUNITY

## 2022-11-10 NOTE — H&P (VIEW-ONLY)
Annual Exam (CPE. Review of Medical Issues)      HPI  Jose Manuel Vasquez is a 62 y.o. male RTC In yearly CPE, review of medical issues: Had some elevated blood pressures recently around time of arm injury. Getting 150/100's for a few weeks. Improved in last few weeks and getting 120's/ 80 range is typical now at home on arm cuff.   1. L bicep tear - occurred after heavy lifting on work.   Had initial bruise, saw ortho, has MRI. Planning surgery with Dr. Cervantes.   2. Hx of DIverticulitis, suspected dx - noted 4/2021, s/p (-) C-scope in 12/2020 RTC In short interval f/u after recent visit ~10 days ago with empiric tx with Augmentin for LLQ pain, diverticulitis.  Stopped probiotics, felt like controlled all GI sx with diet and exercise. C-scope in 12/2/20.   3. Hx of Quadricep tear on R - s/p repair with Dr. Lantigua 10/2017. Really no issues overall.   4. IFG/ Dyslipidemia - noted over time.  Feels like diet and exerics are 'excllent'.  Gets fruits and veggies daily. Lots of rice and lean meats.  Exercise 3-4x/ week with combo with weights and cardio.   5. HM - needs flu today; not had Hep A #2 after #1 in 2018;  Not had Shingrix at pharmacy and is 'not that interested' after prior mild shingles event.     Urination has become an issues.  Feels like more frequency and urgency now, but notes drinking lots of water.  Worse during day than night. Feels like getting bladder emptied well.  Will have some urgency issues. At times cannot quite get to the bathroom and will leak a bit.  Is not wearing diapers.  Stream is overall OK. Not had eval elsewhere on this.  Progressive issue over last year or so.     Review of Systems   Constitutional: Negative for chills, fever, malaise/fatigue, weight gain and weight loss.   HENT: Negative for congestion, hearing loss, odynophagia and sore throat.    Eyes: Negative for discharge, double vision, pain and redness.        Last eye exam unknown; uses readers     Cardiovascular: Negative for  chest pain, dyspnea on exertion, irregular heartbeat, leg swelling, near-syncope, palpitations and syncope.   Respiratory: Negative for cough and shortness of breath.    Endocrine: Negative for polydipsia, polyphagia and polyuria.   Hematologic/Lymphatic: Negative for bleeding problem. Does not bruise/bleed easily.   Skin: Negative for rash and suspicious lesions.   Musculoskeletal: Positive for myalgias (L arm bicep). Negative for joint pain, joint swelling, muscle cramps and muscle weakness.   Gastrointestinal: Negative for bowel incontinence, constipation, diarrhea, dysphagia, heartburn, nausea and vomiting.   Genitourinary: Positive for bladder incontinence (with urgency), frequency (accounts for drinking water more recently. ), nocturia (2-3 x /night.  C/w for years.) and urgency (progressive over last year.  ). Negative for dysuria, hematuria, hesitancy (stream is good) and incomplete emptying (variable a night).   Neurological: Positive for focal weakness (L arm with bicep tear). Negative for dizziness, headaches and light-headedness.   Psychiatric/Behavioral: Negative for depression. The patient does not have insomnia and is not nervous/anxious.    Allergic/Immunologic: Negative for environmental allergies and persistent infections.       Problem List:    Patient Active Problem List   Diagnosis   • Rupture quadriceps tendon, right, subsequent encounter   • Tear of medial meniscus of right knee, current   • Lactose intolerance   • IFG (impaired fasting glucose)   • Other insomnia   • Dyslipidemia   • Family history of colon cancer   • Asymptomatic varicose veins of both lower extremities   • Adenomatous polyp of colon   • Rupture of left biceps tendon       Medical History:    Past Medical History:   Diagnosis Date   • Black-out (not amnesia) 2015    CARDIAC WORK UP OK, NO PROBLEMS SINCE   • COVID-19     x 2   • Diverticulitis 04/2021    presumptive dx wtih empiric tx   • History of MRSA infection 2013     SKIN GROIN, ARMPIT    • Lactose intolerance     manages with avoidance   • Pre-diabetes 2014    managed with diet and exercise.    • Rupture of quadriceps tendon    • Shingles 01/2020   • Tibial tendonitis, posterior, right 09/10/2018    2018, boot --> ankle brace        Social History:    Social History     Socioeconomic History   • Marital status:    • Number of children: 2   Tobacco Use   • Smoking status: Never   • Smokeless tobacco: Never   Vaping Use   • Vaping Use: Never used   Substance and Sexual Activity   • Alcohol use: Yes     Alcohol/week: 6.0 standard drinks     Types: 3 Cans of beer, 3 Shots of liquor per week     Comment: 3-6 drinks/ week   • Drug use: No   • Sexual activity: Yes     Partners: Female     Birth control/protection: Vasectomy     Comment: wife only; no hx STD's       Family History:   Family History   Problem Relation Age of Onset   • COPD Mother    • No Known Problems Father    • Heart disease Brother    • Diabetes Brother    • No Known Problems Maternal Aunt    • No Known Problems Maternal Uncle    • No Known Problems Paternal Aunt    • No Known Problems Paternal Uncle    • No Known Problems Maternal Grandmother    • No Known Problems Maternal Grandfather    • No Known Problems Paternal Grandmother    • No Known Problems Paternal Grandfather    • No Known Problems Brother    • No Known Problems Son    • No Known Problems Daughter    • Anesthesia problems Neg Hx    • Broken bones Neg Hx    • Cancer Neg Hx    • Clotting disorder Neg Hx    • Collagen disease Neg Hx    • Dislocations Neg Hx    • Osteoporosis Neg Hx    • Rheumatologic disease Neg Hx    • Scoliosis Neg Hx    • Severe sprains Neg Hx    • Malig Hyperthermia Neg Hx        Surgical History:   Past Surgical History:   Procedure Laterality Date   • COLONOSCOPY N/A 12/02/2020    Procedure: COLONOSCOPY TO CECUM WITH HOT SNARE POLYPECTOMY;  Surgeon: Maikol Santizo MD;  [facility] ENDOSCOPY;  Service: General;   Laterality: N/A;  SCREENING  --POLYP   • SD FIX QUAD/HAMSTR MUSC RUPT,PRIMARY Right 10/23/2017    Procedure: QUADRICEPS TENDON REPAIR;  Surgeon: Terrence Lantigua MD;  [place] [facility] [place] [facility] [place]  Service: Orthopedics   • VASECTOMY  3/12/1998         Current Outpatient Medications:   •  Acetylcysteine (NAC) capsule capsule, Take  by mouth., Disp: , Rfl:   •  Ascorbic Acid (VITAMIN C PO), Take  by mouth., Disp: , Rfl:   •  Zinc Sulfate (ZINC-220 PO), Take  by mouth., Disp: , Rfl:     Vitals:    11/10/22 0903   BP: 126/78   Pulse: 59   Temp: 97.9 °F (36.6 °C)   SpO2: 99%     Body mass index is 32.58 kg/m².    Physical Exam  Vitals reviewed.   Constitutional:       General: He is not in acute distress.     Appearance: Normal appearance. He is well-developed. He is not ill-appearing or toxic-appearing.   HENT:      Head: Normocephalic and atraumatic.      Right Ear: Hearing, tympanic membrane, ear canal and external ear normal. There is no impacted cerumen.      Left Ear: Hearing, tympanic membrane, ear canal and external ear normal. There is no impacted cerumen.      Nose: Nose normal.      Mouth/Throat:      Mouth: Mucous membranes are moist. No oral lesions.      Tongue: No lesions.      Pharynx: Oropharynx is clear. Uvula midline. No pharyngeal swelling, oropharyngeal exudate, posterior oropharyngeal erythema or uvula swelling.   Eyes:      General: Lids are normal. No scleral icterus.        Right eye: No discharge.         Left eye: No discharge.      Extraocular Movements: Extraocular movements intact.      Conjunctiva/sclera: Conjunctivae normal.      Pupils: Pupils are equal, round, and reactive to light.   Neck:      Thyroid: No thyroid mass or thyromegaly.      Vascular: No carotid bruit.   Cardiovascular:      Rate and Rhythm: Normal rate and regular rhythm.      Pulses:           Radial pulses are 2+ on the right side and 2+ on the left side.        Dorsalis pedis pulses are 2+ on the right side and 2+ on the  left side.        Posterior tibial pulses are 2+ on the right side and 2+ on the left side.      Heart sounds: Normal heart sounds, S1 normal and S2 normal. No murmur heard.    No friction rub. No gallop.   Pulmonary:      Effort: Pulmonary effort is normal. No respiratory distress.      Breath sounds: Normal breath sounds. No wheezing, rhonchi or rales.   Abdominal:      General: Bowel sounds are normal. There is no distension.      Palpations: Abdomen is soft. There is no mass.      Tenderness: There is no abdominal tenderness. There is no guarding or rebound.   Genitourinary:     Prostate: Normal. Not enlarged and not tender.      Rectum: Normal. No external hemorrhoid. Normal anal tone.   Musculoskeletal:         General: Normal range of motion.      Right shoulder: Normal range of motion. Normal strength.      Left shoulder: Normal range of motion. Normal strength.      Left upper arm: Deformity (slight joy deformity on L bicep) present. No lacerations.      Right hand: Normal range of motion. Normal strength.      Left hand: Normal range of motion. Normal strength.      Cervical back: Full passive range of motion without pain, normal range of motion and neck supple.      Right lower leg: No edema.      Left lower leg: No edema.   Lymphadenopathy:      Cervical: No cervical adenopathy.      Right cervical: No superficial, deep or posterior cervical adenopathy.     Left cervical: No superficial, deep or posterior cervical adenopathy.      Upper Body:      Right upper body: No supraclavicular, axillary or pectoral adenopathy.      Left upper body: No supraclavicular, axillary or pectoral adenopathy.   Skin:     General: Skin is warm and dry.      Findings: Bruising (at L anterior forearm and elbow, resolving. ) present. No rash.   Neurological:      Mental Status: He is alert and oriented to person, place, and time.      Cranial Nerves: No cranial nerve deficit.      Sensory: No sensory deficit.      Motor: No  weakness, tremor, atrophy or abnormal muscle tone.      Gait: Gait normal.      Deep Tendon Reflexes: Reflexes are normal and symmetric.      Reflex Scores:       Patellar reflexes are 2+ on the right side and 2+ on the left side.       Achilles reflexes are 2+ on the right side and 2+ on the left side.  Psychiatric:         Attention and Perception: Attention normal.         Mood and Affect: Mood normal.         Speech: Speech normal.         Behavior: Behavior normal. Behavior is cooperative.         Thought Content: Thought content normal.         Assessment/ Plan  Diagnoses and all orders for this visit:    Encounter for general adult medical examination with abnormal findings  -     Hepatitis A Vaccine Adult IM    Other tear of medial meniscus of right knee as current injury, subsequent encounter    Rupture quadriceps tendon, right, subsequent encounter    IFG (impaired fasting glucose)    Family history of colon cancer    Adenomatous polyp of colon, unspecified part of colon    Asymptomatic varicose veins of both lower extremities    Dyslipidemia    Rupture of left biceps tendon, initial encounter    Needs flu shot  -     FluLaval/Fluarix/Fluzone >6 Months    Lactose intolerance    Urinary urgency  -     US Bladder Volume; Future    Urge incontinence of urine  -     US Bladder Volume; Future    Other orders  -     Acetylcysteine (NAC) capsule capsule; Take  by mouth.        Return in about 1 year (around 11/10/2023) for Annual physical.      Discussion:  Jose Manuel Vasquez is a 62 y.o. male RTC In yearly CPE, review of medical issues:  1. L bicep tear - subacute,  occurred after heavy lifting on work.   S/p ortho eval and MRI; surgery per Dr. Cervantes.   2. Diverticulitis, suspected dx, 4/2021 - s/p (-) C-scope in 12/2020 without noted diverticuli.  Regardless, DASHA.   3. Quadricep tear on R - s/p repair with Dr. Lantigua 10/2017. DASHA.  4. IFG/ Dyslipidemia - issues present despite overall good TLC mods, diet and  exercise.  hsCRP normal in 9/2018.  Pt does have higher BMI with noted muscular habitus, but in d/w pt does seem to have a high volume of simple carbs (white rice, etc) and relative lack of complex carbs.  D/W that with good HDL, would hold on statin addition. However may want to consider C.C. scoring to help guide this decision long term.  D/W pt ways to improve carb content of diet. Will trend numbers over time.   5. Hx of MRSA - had issue in 2013, resolved.   6. Urinary urgency and mild urge incontinence - new issue today, bothersome.  Prostate exam overall OK.  I suspect this is issue of urge incontinence, but will get US bladder with PVR first to r/o obstruction issue.  D/W pt first step is to cut down on caffeine daily and reduce ETOH intake. If sx persist, will consider med options. Will f/u with pt after US results return.   7. HM - labs d/w pt; Flu/ Hep A #2 - today; Tdap/  COVID primary - UTD; COVID booster andShingrix d/w pt, complete at pharmacy; C-scope 12/20 (1 x <1cm TA) --> 5 years; QUANG/ PSA OK; Hep C Ab screen (-) 9/2018; c/w exercise with goal of some weight loss.    RTC one year CPE, F labs prior

## 2022-11-10 NOTE — PROGRESS NOTES
Annual Exam (CPE. Review of Medical Issues)      HPI  Jose Manuel Vasquez is a 62 y.o. male RTC In yearly CPE, review of medical issues: Had some elevated blood pressures recently around time of arm injury. Getting 150/100's for a few weeks. Improved in last few weeks and getting 120's/ 80 range is typical now at home on arm cuff.   1. L bicep tear - occurred after heavy lifting on work.   Had initial bruise, saw ortho, has MRI. Planning surgery with Dr. Cervantes.   2. Hx of DIverticulitis, suspected dx - noted 4/2021, s/p (-) C-scope in 12/2020 RTC In short interval f/u after recent visit ~10 days ago with empiric tx with Augmentin for LLQ pain, diverticulitis.  Stopped probiotics, felt like controlled all GI sx with diet and exercise. C-scope in 12/2/20.   3. Hx of Quadricep tear on R - s/p repair with Dr. Lantigua 10/2017. Really no issues overall.   4. IFG/ Dyslipidemia - noted over time.  Feels like diet and exerics are 'excllent'.  Gets fruits and veggies daily. Lots of rice and lean meats.  Exercise 3-4x/ week with combo with weights and cardio.   5. HM - needs flu today; not had Hep A #2 after #1 in 2018;  Not had Shingrix at pharmacy and is 'not that interested' after prior mild shingles event.     Urination has become an issues.  Feels like more frequency and urgency now, but notes drinking lots of water.  Worse during day than night. Feels like getting bladder emptied well.  Will have some urgency issues. At times cannot quite get to the bathroom and will leak a bit.  Is not wearing diapers.  Stream is overall OK. Not had eval elsewhere on this.  Progressive issue over last year or so.     Review of Systems   Constitutional: Negative for chills, fever, malaise/fatigue, weight gain and weight loss.   HENT: Negative for congestion, hearing loss, odynophagia and sore throat.    Eyes: Negative for discharge, double vision, pain and redness.        Last eye exam unknown; uses readers     Cardiovascular: Negative for  chest pain, dyspnea on exertion, irregular heartbeat, leg swelling, near-syncope, palpitations and syncope.   Respiratory: Negative for cough and shortness of breath.    Endocrine: Negative for polydipsia, polyphagia and polyuria.   Hematologic/Lymphatic: Negative for bleeding problem. Does not bruise/bleed easily.   Skin: Negative for rash and suspicious lesions.   Musculoskeletal: Positive for myalgias (L arm bicep). Negative for joint pain, joint swelling, muscle cramps and muscle weakness.   Gastrointestinal: Negative for bowel incontinence, constipation, diarrhea, dysphagia, heartburn, nausea and vomiting.   Genitourinary: Positive for bladder incontinence (with urgency), frequency (accounts for drinking water more recently. ), nocturia (2-3 x /night.  C/w for years.) and urgency (progressive over last year.  ). Negative for dysuria, hematuria, hesitancy (stream is good) and incomplete emptying (variable a night).   Neurological: Positive for focal weakness (L arm with bicep tear). Negative for dizziness, headaches and light-headedness.   Psychiatric/Behavioral: Negative for depression. The patient does not have insomnia and is not nervous/anxious.    Allergic/Immunologic: Negative for environmental allergies and persistent infections.       Problem List:    Patient Active Problem List   Diagnosis   • Rupture quadriceps tendon, right, subsequent encounter   • Tear of medial meniscus of right knee, current   • Lactose intolerance   • IFG (impaired fasting glucose)   • Other insomnia   • Dyslipidemia   • Family history of colon cancer   • Asymptomatic varicose veins of both lower extremities   • Adenomatous polyp of colon   • Rupture of left biceps tendon       Medical History:    Past Medical History:   Diagnosis Date   • Black-out (not amnesia) 2015    CARDIAC WORK UP OK, NO PROBLEMS SINCE   • COVID-19     x 2   • Diverticulitis 04/2021    presumptive dx wtih empiric tx   • History of MRSA infection 2013     SKIN GROIN, ARMPIT    • Lactose intolerance     manages with avoidance   • Pre-diabetes 2014    managed with diet and exercise.    • Rupture of quadriceps tendon    • Shingles 01/2020   • Tibial tendonitis, posterior, right 09/10/2018    2018, boot --> ankle brace        Social History:    Social History     Socioeconomic History   • Marital status:    • Number of children: 2   Tobacco Use   • Smoking status: Never   • Smokeless tobacco: Never   Vaping Use   • Vaping Use: Never used   Substance and Sexual Activity   • Alcohol use: Yes     Alcohol/week: 6.0 standard drinks     Types: 3 Cans of beer, 3 Shots of liquor per week     Comment: 3-6 drinks/ week   • Drug use: No   • Sexual activity: Yes     Partners: Female     Birth control/protection: Vasectomy     Comment: wife only; no hx STD's       Family History:   Family History   Problem Relation Age of Onset   • COPD Mother    • No Known Problems Father    • Heart disease Brother    • Diabetes Brother    • No Known Problems Maternal Aunt    • No Known Problems Maternal Uncle    • No Known Problems Paternal Aunt    • No Known Problems Paternal Uncle    • No Known Problems Maternal Grandmother    • No Known Problems Maternal Grandfather    • No Known Problems Paternal Grandmother    • No Known Problems Paternal Grandfather    • No Known Problems Brother    • No Known Problems Son    • No Known Problems Daughter    • Anesthesia problems Neg Hx    • Broken bones Neg Hx    • Cancer Neg Hx    • Clotting disorder Neg Hx    • Collagen disease Neg Hx    • Dislocations Neg Hx    • Osteoporosis Neg Hx    • Rheumatologic disease Neg Hx    • Scoliosis Neg Hx    • Severe sprains Neg Hx    • Malig Hyperthermia Neg Hx        Surgical History:   Past Surgical History:   Procedure Laterality Date   • COLONOSCOPY N/A 12/02/2020    Procedure: COLONOSCOPY TO CECUM WITH HOT SNARE POLYPECTOMY;  Surgeon: Maikol Santizo MD;  [facility] ENDOSCOPY;  Service: General;   Laterality: N/A;  SCREENING  --POLYP   • OH FIX QUAD/HAMSTR MUSC RUPT,PRIMARY Right 10/23/2017    Procedure: QUADRICEPS TENDON REPAIR;  Surgeon: Terrence Lantigua MD;  Location: [facility] OR [facility] [place];  Service: Orthopedics   • VASECTOMY  3/12/1998         Current Outpatient Medications:   •  Acetylcysteine (NAC) capsule capsule, Take  by mouth., Disp: , Rfl:   •  Ascorbic Acid (VITAMIN C PO), Take  by mouth., Disp: , Rfl:   •  Zinc Sulfate (ZINC-220 PO), Take  by mouth., Disp: , Rfl:     Vitals:    11/10/22 0903   BP: 126/78   Pulse: 59   Temp: 97.9 °F (36.6 °C)   SpO2: 99%     Body mass index is 32.58 kg/m².    Physical Exam  Vitals reviewed.   Constitutional:       General: He is not in acute distress.     Appearance: Normal appearance. He is well-developed. He is not ill-appearing or toxic-appearing.   HENT:      Head: Normocephalic and atraumatic.      Right Ear: Hearing, tympanic membrane, ear canal and external ear normal. There is no impacted cerumen.      Left Ear: Hearing, tympanic membrane, ear canal and external ear normal. There is no impacted cerumen.      Nose: Nose normal.      Mouth/Throat:      Mouth: Mucous membranes are moist. No oral lesions.      Tongue: No lesions.      Pharynx: Oropharynx is clear. Uvula midline. No pharyngeal swelling, oropharyngeal exudate, posterior oropharyngeal erythema or uvula swelling.   Eyes:      General: Lids are normal. No scleral icterus.        Right eye: No discharge.         Left eye: No discharge.      Extraocular Movements: Extraocular movements intact.      Conjunctiva/sclera: Conjunctivae normal.      Pupils: Pupils are equal, round, and reactive to light.   Neck:      Thyroid: No thyroid mass or thyromegaly.      Vascular: No carotid bruit.   Cardiovascular:      Rate and Rhythm: Normal rate and regular rhythm.      Pulses:           Radial pulses are 2+ on the right side and 2+ on the left side.        Dorsalis pedis pulses are 2+ on the right side and 2+ on the  left side.        Posterior tibial pulses are 2+ on the right side and 2+ on the left side.      Heart sounds: Normal heart sounds, S1 normal and S2 normal. No murmur heard.    No friction rub. No gallop.   Pulmonary:      Effort: Pulmonary effort is normal. No respiratory distress.      Breath sounds: Normal breath sounds. No wheezing, rhonchi or rales.   Abdominal:      General: Bowel sounds are normal. There is no distension.      Palpations: Abdomen is soft. There is no mass.      Tenderness: There is no abdominal tenderness. There is no guarding or rebound.   Genitourinary:     Prostate: Normal. Not enlarged and not tender.      Rectum: Normal. No external hemorrhoid. Normal anal tone.   Musculoskeletal:         General: Normal range of motion.      Right shoulder: Normal range of motion. Normal strength.      Left shoulder: Normal range of motion. Normal strength.      Left upper arm: Deformity (slight joy deformity on L bicep) present. No lacerations.      Right hand: Normal range of motion. Normal strength.      Left hand: Normal range of motion. Normal strength.      Cervical back: Full passive range of motion without pain, normal range of motion and neck supple.      Right lower leg: No edema.      Left lower leg: No edema.   Lymphadenopathy:      Cervical: No cervical adenopathy.      Right cervical: No superficial, deep or posterior cervical adenopathy.     Left cervical: No superficial, deep or posterior cervical adenopathy.      Upper Body:      Right upper body: No supraclavicular, axillary or pectoral adenopathy.      Left upper body: No supraclavicular, axillary or pectoral adenopathy.   Skin:     General: Skin is warm and dry.      Findings: Bruising (at L anterior forearm and elbow, resolving. ) present. No rash.   Neurological:      Mental Status: He is alert and oriented to person, place, and time.      Cranial Nerves: No cranial nerve deficit.      Sensory: No sensory deficit.      Motor: No  weakness, tremor, atrophy or abnormal muscle tone.      Gait: Gait normal.      Deep Tendon Reflexes: Reflexes are normal and symmetric.      Reflex Scores:       Patellar reflexes are 2+ on the right side and 2+ on the left side.       Achilles reflexes are 2+ on the right side and 2+ on the left side.  Psychiatric:         Attention and Perception: Attention normal.         Mood and Affect: Mood normal.         Speech: Speech normal.         Behavior: Behavior normal. Behavior is cooperative.         Thought Content: Thought content normal.         Assessment/ Plan  Diagnoses and all orders for this visit:    Encounter for general adult medical examination with abnormal findings  -     Hepatitis A Vaccine Adult IM    Other tear of medial meniscus of right knee as current injury, subsequent encounter    Rupture quadriceps tendon, right, subsequent encounter    IFG (impaired fasting glucose)    Family history of colon cancer    Adenomatous polyp of colon, unspecified part of colon    Asymptomatic varicose veins of both lower extremities    Dyslipidemia    Rupture of left biceps tendon, initial encounter    Needs flu shot  -     FluLaval/Fluarix/Fluzone >6 Months    Lactose intolerance    Urinary urgency  -     US Bladder Volume; Future    Urge incontinence of urine  -     US Bladder Volume; Future    Other orders  -     Acetylcysteine (NAC) capsule capsule; Take  by mouth.        Return in about 1 year (around 11/10/2023) for Annual physical.      Discussion:  Jose Manuel Vasquez is a 62 y.o. male RTC In yearly CPE, review of medical issues:  1. L bicep tear - subacute,  occurred after heavy lifting on work.   S/p ortho eval and MRI; surgery per Dr. Cervantes.   2. Diverticulitis, suspected dx, 4/2021 - s/p (-) C-scope in 12/2020 without noted diverticuli.  Regardless, DASHA.   3. Quadricep tear on R - s/p repair with Dr. Lantigua 10/2017. DASHA.  4. IFG/ Dyslipidemia - issues present despite overall good TLC mods, diet and  exercise.  hsCRP normal in 9/2018.  Pt does have higher BMI with noted muscular habitus, but in d/w pt does seem to have a high volume of simple carbs (white rice, etc) and relative lack of complex carbs.  D/W that with good HDL, would hold on statin addition. However may want to consider C.C. scoring to help guide this decision long term.  D/W pt ways to improve carb content of diet. Will trend numbers over time.   5. Hx of MRSA - had issue in 2013, resolved.   6. Urinary urgency and mild urge incontinence - new issue today, bothersome.  Prostate exam overall OK.  I suspect this is issue of urge incontinence, but will get US bladder with PVR first to r/o obstruction issue.  D/W pt first step is to cut down on caffeine daily and reduce ETOH intake. If sx persist, will consider med options. Will f/u with pt after US results return.   7. HM - labs d/w pt; Flu/ Hep A #2 - today; Tdap/  COVID primary - UTD; COVID booster andShingrix d/w pt, complete at pharmacy; C-scope 12/20 (1 x <1cm TA) --> 5 years; QUANG/ PSA OK; Hep C Ab screen (-) 9/2018; c/w exercise with goal of some weight loss.    RTC one year CPE, F labs prior

## 2022-11-10 NOTE — PATIENT INSTRUCTIONS
Shingrix (new shingles shot; 2 shot series) check at pharmacy  COVID updated booster at pharmacy  Ophthalmology exam due, schedule

## 2022-11-15 ENCOUNTER — TRANSCRIBE ORDERS (OUTPATIENT)
Dept: CARDIOLOGY | Facility: HOSPITAL | Age: 62
End: 2022-11-15

## 2022-11-15 ENCOUNTER — HOSPITAL ENCOUNTER (OUTPATIENT)
Dept: CARDIOLOGY | Facility: HOSPITAL | Age: 62
Discharge: HOME | End: 2022-11-15
Admitting: SPECIALIST

## 2022-11-15 DIAGNOSIS — I10 ESSENTIAL HYPERTENSION, MALIGNANT: Primary | ICD-10-CM

## 2022-11-15 LAB — QT INTERVAL: 425 MS

## 2022-11-15 PROCEDURE — 93010 ELECTROCARDIOGRAM REPORT: CPT | Performed by: INTERNAL MEDICINE

## 2022-11-15 PROCEDURE — 93005 ELECTROCARDIOGRAM TRACING: CPT

## 2022-11-21 ENCOUNTER — TRANSCRIBE ORDERS (OUTPATIENT)
Dept: GENERAL RADIOLOGY | Facility: SURGERY CENTER | Age: 62
End: 2022-11-21

## 2022-11-21 DIAGNOSIS — Z41.9 SURGERY, ELECTIVE: Primary | ICD-10-CM

## 2022-11-21 NOTE — SIGNIFICANT NOTE
Education provided the Patient on the following:    - Nothing to Eat or Drink after MN the night before the procedure  -You will need to have someone drive you home after your Surgery and remain with you for 24 hours after the Procedure  - The date of your procedure, your are welcome to have one visitor at bedside or remain within 10-15 minutes of [facility]  -Please wear warm socks when you arrive for your Surgery  -Remove all jewelry and leave any valuables before arriving on the date of your procedure (all will have to be removed before leaving preop)  -You will need to arrive at 0600 on 11/22 Surgery  -Feel free to contact us at: [phone] with any additional questions/concerns

## 2022-11-22 ENCOUNTER — ANESTHESIA (OUTPATIENT)
Dept: SURGERY | Facility: SURGERY CENTER | Age: 62
End: 2022-11-22

## 2022-11-22 ENCOUNTER — HOSPITAL ENCOUNTER (OUTPATIENT)
Dept: GENERAL RADIOLOGY | Facility: SURGERY CENTER | Age: 62
Setting detail: HOSPITAL OUTPATIENT SURGERY
End: 2022-11-22

## 2022-11-22 ENCOUNTER — ANESTHESIA EVENT (OUTPATIENT)
Dept: SURGERY | Facility: SURGERY CENTER | Age: 62
End: 2022-11-22

## 2022-11-22 ENCOUNTER — HOSPITAL ENCOUNTER (OUTPATIENT)
Facility: SURGERY CENTER | Age: 62
Setting detail: HOSPITAL OUTPATIENT SURGERY
Discharge: HOME | End: 2022-11-22
Attending: SPECIALIST | Admitting: SPECIALIST

## 2022-11-22 VITALS
BODY MASS INDEX: 33.06 KG/M2 | OXYGEN SATURATION: 95 % | DIASTOLIC BLOOD PRESSURE: 92 MMHG | HEIGHT: 69 IN | HEART RATE: 62 BPM | RESPIRATION RATE: 16 BRPM | TEMPERATURE: 98.2 F | SYSTOLIC BLOOD PRESSURE: 139 MMHG | WEIGHT: 223.2 LBS

## 2022-11-22 DIAGNOSIS — Z41.9 SURGERY, ELECTIVE: ICD-10-CM

## 2022-11-22 PROCEDURE — 25010000002 FENTANYL CITRATE (PF) 50 MCG/ML SOLUTION: Performed by: ANESTHESIOLOGY

## 2022-11-22 PROCEDURE — 25010000002 VANCOMYCIN PER 500 MG: Performed by: ANESTHESIOLOGY

## 2022-11-22 PROCEDURE — C1713 ANCHOR/SCREW BN/BN,TIS/BN: HCPCS | Performed by: SPECIALIST

## 2022-11-22 PROCEDURE — 25010000002 ONDANSETRON PER 1 MG: Performed by: ANESTHESIOLOGY

## 2022-11-22 PROCEDURE — 25010000002 MIDAZOLAM PER 1 MG: Performed by: ANESTHESIOLOGY

## 2022-11-22 PROCEDURE — 24342 REPAIR OF RUPTURED TENDON: CPT | Performed by: SPECIALIST

## 2022-11-22 PROCEDURE — 76000 FLUOROSCOPY <1 HR PHYS/QHP: CPT

## 2022-11-22 PROCEDURE — 0 LIDOCAINE 1 % SOLUTION: Performed by: ANESTHESIOLOGY

## 2022-11-22 PROCEDURE — 25010000002 PROPOFOL 10 MG/ML EMULSION: Performed by: ANESTHESIOLOGY

## 2022-11-22 DEVICE — HEALIX TI 3-SUTURE ANCHOR W/ORTHOCORD TITANIUM ANCHOR (1) VIOLET (1) BLUE STRAND, (1) BLUE STRIPED, SIZE 2 (5 METRIC) ORTHOCORD BRAIDED COMPOSITE SUTURE, 36 INCHES (91CM) 4.5MM
Type: IMPLANTABLE DEVICE | Site: ARM | Status: FUNCTIONAL
Brand: HEALIX TRANSTEND ORTHOCORD

## 2022-11-22 RX ORDER — LIDOCAINE HYDROCHLORIDE 10 MG/ML
0.5 INJECTION, SOLUTION INFILTRATION; PERINEURAL ONCE AS NEEDED
Status: DISCONTINUED | OUTPATIENT
Start: 2022-11-22 | End: 2022-11-22 | Stop reason: HOSPADM

## 2022-11-22 RX ORDER — LIDOCAINE HYDROCHLORIDE 10 MG/ML
INJECTION, SOLUTION INFILTRATION; PERINEURAL AS NEEDED
Status: DISCONTINUED | OUTPATIENT
Start: 2022-11-22 | End: 2022-11-22 | Stop reason: SURG

## 2022-11-22 RX ORDER — MIDAZOLAM HYDROCHLORIDE 1 MG/ML
1 INJECTION INTRAMUSCULAR; INTRAVENOUS
Status: DISCONTINUED | OUTPATIENT
Start: 2022-11-22 | End: 2022-11-22 | Stop reason: HOSPADM

## 2022-11-22 RX ORDER — SODIUM CHLORIDE 0.9 % (FLUSH) 0.9 %
10 SYRINGE (ML) INJECTION AS NEEDED
Status: DISCONTINUED | OUTPATIENT
Start: 2022-11-22 | End: 2022-11-22 | Stop reason: HOSPADM

## 2022-11-22 RX ORDER — OXYCODONE HYDROCHLORIDE AND ACETAMINOPHEN 5; 325 MG/1; MG/1
2 TABLET ORAL EVERY 6 HOURS PRN
Status: DISCONTINUED | OUTPATIENT
Start: 2022-11-22 | End: 2022-11-22 | Stop reason: HOSPADM

## 2022-11-22 RX ORDER — FENTANYL CITRATE 50 UG/ML
50 INJECTION, SOLUTION INTRAMUSCULAR; INTRAVENOUS
Status: DISCONTINUED | OUTPATIENT
Start: 2022-11-22 | End: 2022-11-22 | Stop reason: HOSPADM

## 2022-11-22 RX ORDER — FAMOTIDINE 10 MG/ML
20 INJECTION, SOLUTION INTRAVENOUS ONCE
Status: COMPLETED | OUTPATIENT
Start: 2022-11-22 | End: 2022-11-22

## 2022-11-22 RX ORDER — SODIUM CHLORIDE, SODIUM LACTATE, POTASSIUM CHLORIDE, CALCIUM CHLORIDE 600; 310; 30; 20 MG/100ML; MG/100ML; MG/100ML; MG/100ML
1000 INJECTION, SOLUTION INTRAVENOUS CONTINUOUS
Status: DISCONTINUED | OUTPATIENT
Start: 2022-11-22 | End: 2022-11-22 | Stop reason: HOSPADM

## 2022-11-22 RX ORDER — ONDANSETRON 2 MG/ML
INJECTION INTRAMUSCULAR; INTRAVENOUS AS NEEDED
Status: DISCONTINUED | OUTPATIENT
Start: 2022-11-22 | End: 2022-11-22 | Stop reason: SURG

## 2022-11-22 RX ORDER — ONDANSETRON 2 MG/ML
4 INJECTION INTRAMUSCULAR; INTRAVENOUS ONCE AS NEEDED
Status: DISCONTINUED | OUTPATIENT
Start: 2022-11-22 | End: 2022-11-22 | Stop reason: HOSPADM

## 2022-11-22 RX ORDER — VANCOMYCIN HYDROCHLORIDE 1 G/20ML
1.5 INJECTION, POWDER, LYOPHILIZED, FOR SOLUTION INTRAVENOUS ONCE
Status: DISCONTINUED | OUTPATIENT
Start: 2022-11-22 | End: 2022-11-22 | Stop reason: HOSPADM

## 2022-11-22 RX ORDER — PROPOFOL 10 MG/ML
VIAL (ML) INTRAVENOUS AS NEEDED
Status: DISCONTINUED | OUTPATIENT
Start: 2022-11-22 | End: 2022-11-22 | Stop reason: SURG

## 2022-11-22 RX ORDER — MAGNESIUM HYDROXIDE 1200 MG/15ML
LIQUID ORAL AS NEEDED
Status: DISCONTINUED | OUTPATIENT
Start: 2022-11-22 | End: 2022-11-22 | Stop reason: HOSPADM

## 2022-11-22 RX ORDER — VANCOMYCIN HYDROCHLORIDE 500 MG/10ML
INJECTION, POWDER, LYOPHILIZED, FOR SOLUTION INTRAVENOUS AS NEEDED
Status: DISCONTINUED | OUTPATIENT
Start: 2022-11-22 | End: 2022-11-22 | Stop reason: SURG

## 2022-11-22 RX ORDER — SODIUM CHLORIDE, SODIUM LACTATE, POTASSIUM CHLORIDE, CALCIUM CHLORIDE 600; 310; 30; 20 MG/100ML; MG/100ML; MG/100ML; MG/100ML
9 INJECTION, SOLUTION INTRAVENOUS CONTINUOUS
Status: DISCONTINUED | OUTPATIENT
Start: 2022-11-22 | End: 2022-11-22 | Stop reason: HOSPADM

## 2022-11-22 RX ORDER — VANCOMYCIN HYDROCHLORIDE 1 G/20ML
1.5 INJECTION, POWDER, LYOPHILIZED, FOR SOLUTION INTRAVENOUS ONCE
Status: CANCELLED | OUTPATIENT
Start: 2022-11-22

## 2022-11-22 RX ORDER — BUPIVACAINE HYDROCHLORIDE 5 MG/ML
INJECTION, SOLUTION EPIDURAL; INTRACAUDAL
Status: COMPLETED | OUTPATIENT
Start: 2022-11-22 | End: 2022-11-22

## 2022-11-22 RX ORDER — GLYCOPYRROLATE 0.2 MG/ML
INJECTION INTRAMUSCULAR; INTRAVENOUS AS NEEDED
Status: DISCONTINUED | OUTPATIENT
Start: 2022-11-22 | End: 2022-11-22 | Stop reason: SURG

## 2022-11-22 RX ADMIN — OXYCODONE HYDROCHLORIDE AND ACETAMINOPHEN 2 TABLET: 5; 325 TABLET ORAL at 09:58

## 2022-11-22 RX ADMIN — FENTANYL CITRATE 50 MCG: 50 INJECTION, SOLUTION INTRAMUSCULAR; INTRAVENOUS at 07:23

## 2022-11-22 RX ADMIN — LIDOCAINE HYDROCHLORIDE 40 MG: 10 INJECTION, SOLUTION INFILTRATION; PERINEURAL at 07:56

## 2022-11-22 RX ADMIN — ONDANSETRON 4 MG: 2 INJECTION INTRAMUSCULAR; INTRAVENOUS at 07:56

## 2022-11-22 RX ADMIN — BUPIVACAINE HYDROCHLORIDE 20 ML: 5 INJECTION, SOLUTION EPIDURAL; INTRACAUDAL; PERINEURAL at 07:27

## 2022-11-22 RX ADMIN — GLYCOPYRROLATE 0.2 MG: 0.2 INJECTION, SOLUTION INTRAMUSCULAR; INTRAVENOUS at 07:56

## 2022-11-22 RX ADMIN — FAMOTIDINE 20 MG: 10 INJECTION INTRAVENOUS at 07:23

## 2022-11-22 RX ADMIN — SODIUM CHLORIDE, POTASSIUM CHLORIDE, SODIUM LACTATE AND CALCIUM CHLORIDE 1000 ML: 600; 310; 30; 20 INJECTION, SOLUTION INTRAVENOUS at 06:47

## 2022-11-22 RX ADMIN — MIDAZOLAM 2 MG: 1 INJECTION INTRAMUSCULAR; INTRAVENOUS at 07:23

## 2022-11-22 RX ADMIN — VANCOMYCIN HYDROCHLORIDE 1500 MG: 500 INJECTION, POWDER, LYOPHILIZED, FOR SOLUTION INTRAVENOUS at 08:01

## 2022-11-22 RX ADMIN — PROPOFOL 200 MG: 10 INJECTION, EMULSION INTRAVENOUS at 07:57

## 2022-11-22 NOTE — ANESTHESIA PREPROCEDURE EVALUATION
Anesthesia Evaluation     Patient summary reviewed and Nursing notes reviewed   no history of anesthetic complications:  NPO Solid Status: > 6 hours  NPO Liquid Status: > 6 hours           Airway   Mallampati: II  TM distance: >3 FB  Neck ROM: full  no difficulty expected and No difficulty expected  Dental - normal exam     Pulmonary - negative pulmonary ROS and normal exam    breath sounds clear to auscultation  (-) rhonchi, decreased breath sounds, wheezes, rales, stridor  Cardiovascular - normal exam    NYHA Classification: I  Rhythm: regular  Rate: normal    (+) hyperlipidemia,   (-) murmur, weak pulses, friction rub, systolic click, carotid bruits, JVD, peripheral edema      Neuro/Psych- negative ROS  GI/Hepatic/Renal/Endo - negative ROS     Musculoskeletal (-) negative ROS    Abdominal  - normal exam    Abdomen: soft.   Substance History - negative use     OB/GYN negative ob/gyn ROS         Other - negative ROS                       Anesthesia Plan    ASA 1     general with block     intravenous induction     Anesthetic plan, risks, benefits, and alternatives have been provided, discussed and informed consent has been obtained with: patient.        CODE STATUS:

## 2022-11-22 NOTE — OP NOTE
PREOPERATIVE DIAGNOSIS: Left distal biceps tendon rupture  POSTOPERATIVE DIAGNOSIS: Same  PROCEDURE PERFORMED: Left distal biceps tendon    SURGEON:  Donna Cervantes MD/ Nuha Leon PA-C    TOURNIQUET TIME: Not applicable    COMPLICATIONS: None    DESCRIPTION OF PROCEDURE: The patient was brought to the operative suite where general trach anesthesia was administered followed by 1.5 g of vancomycin.  He has a history of MRSA.    My physician assistant Alona was a necessary part of the procedure.  This was a complicated distal biceps tendon repair.  It was somewhat chronic.  He required a second set of skilled hands for retraction and keeping the field clear of any bleeding.  It was also important that I had a skilled set of hands for hemostasis purposes when tying off blood vessels.  Therefore my physician assistant was a medically necessary part of this operation.    The left upper extremity was prepped and draped in sterile fashion.  A tourniquet was placed proximal.  A surgical pause was completed.  The image intensifier was brought into the surgical field.  Identified where the bicipital tuberosity was located.  Made a 3 cm incision over this area.  Made a incision proximal to the antecubital fossa.  Retractors were placed.  Identified the biceps tendon which was adhered and scarred down to surrounding blood vessels and tissue.  Retractors were placed.  I was able to carefully dissect the distal biceps tendon.  I also dissected the distal biceps tendon in the distal incision site.  Multiple bleeders were encountered.  These were tied off with 2-0 silk.  Eventually the distal biceps tendon was freed up of all scar tissue around the neurovascular bundle.  I tagged it with 3-0 Ethibond sutures.    The image intensifier was brought back into the surgical field.  I placed a drill tunnel through the greater tuberosity.  Placed my 4.5 triple loaded Mytec suture anchor.  The using a Hilliards suture configuration  pattern interwoven my sutures to the distal end of the biceps tendon.  With a free lambda past 1 limb through the distal bicep tendon and was able to tie the tendon securely down to the radial tuberosity.  The arm was in 70 degrees of flexion during this portion of the procedure.  The tendon had become somewhat contracted.  But I was able to get anatomical reproduction of the distal biceps tendon be attached to the bicipital tuberosity.  The wound was copiously irrigated all bleeders were cauterized.  The subcu was closed with a running 4-0 Vicryl and the skin with a running 3-0 Monocryl.  Steri-Strips were then applied.  Followed by a well-padded Favio Ramos splint.  Care was taken to place extra padding over the cubital tunnel.  Needle count sponge count correct x2 patient tolerated procedure well.  He was extubated in the operating room transferred recovery room in stable condition.  When seen in the office is Favio Ramos splint will be removed to be placed into a hinged elbow splint which will allow full flexion but extension only to 90 degrees for a total of 6 weeks.

## 2022-11-22 NOTE — ANESTHESIA PROCEDURE NOTES
Peripheral Block      Patient reassessed immediately prior to procedure    Patient location during procedure: pre-op  Start time: 11/22/2022 7:20 AM  Stop time: 11/22/2022 7:27 AM  Reason for block: at surgeon's request and post-op pain management  Performed by  Anesthesiologist: Fortunato Roper MD  Preanesthetic Checklist  Completed: patient identified, IV checked, site marked, risks and benefits discussed, surgical consent, monitors and equipment checked, pre-op evaluation and timeout performed  Prep:  Pt Position: supine  Sterile barriers:cap, gloves and mask  Prep: ChloraPrep  Patient monitoring: blood pressure monitoring, continuous pulse oximetry and EKG  Procedure    Sedation: yes  Performed under: local infiltration  Guidance:ultrasound guided    ULTRASOUND INTERPRETATION.  Using ultrasound guidance a 22 G gauge needle was placed in close proximity to the nerve, at which point, under ultrasound guidance anesthetic was injected in the area of the nerve and spread of the anesthesia was seen on ultrasound in close proximity thereto.  There were no abnormalities seen on ultrasound; a digital image was taken; and the patient tolerated the procedure with no complications. Images:still images obtained, printed/placed on chart    Laterality:left  Block Type:supraclavicular and interscalene  Injection Technique:single-shot  Needle Type:echogenic  Needle Gauge:22 G      Medications Used: bupivacaine PF (MARCAINE) injection 0.5% - Epidural   20 mL - 11/22/2022 7:27:00 AM      Post Assessment  Injection Assessment: negative aspiration for heme, no paresthesia on injection and incremental injection  Patient Tolerance:comfortable throughout block  Complications:no  Additional Notes  Ultrasound guidance was used for needle placement and verify medication disbursement.

## 2022-11-22 NOTE — ANESTHESIA PROCEDURE NOTES
Airway  Urgency: elective    Date/Time: 11/22/2022 7:59 AM  Airway not difficult    General Information and Staff    Patient location during procedure: OR  Anesthesiologist: Fortunato Roper MD    Indications and Patient Condition    Preoxygenated: yes  MILS maintained throughout  Mask difficulty assessment: 0 - not attempted    Final Airway Details  Final airway type: supraglottic airway      Successful airway: unique  Size 5     Number of attempts at approach: 1  Assessment: lips, teeth, and gum same as pre-op and atraumatic intubation

## 2022-12-05 ENCOUNTER — HOSPITAL ENCOUNTER (EMERGENCY)
Facility: HOSPITAL | Age: 62
Discharge: HOME | End: 2022-12-05
Attending: EMERGENCY MEDICINE | Admitting: EMERGENCY MEDICINE

## 2022-12-05 VITALS
DIASTOLIC BLOOD PRESSURE: 88 MMHG | SYSTOLIC BLOOD PRESSURE: 146 MMHG | HEART RATE: 66 BPM | OXYGEN SATURATION: 93 % | TEMPERATURE: 96.9 F | RESPIRATION RATE: 18 BRPM

## 2022-12-05 DIAGNOSIS — I82.461 ACUTE DEEP VEIN THROMBOSIS (DVT) OF CALF MUSCLE VEIN OF RIGHT LOWER EXTREMITY: Primary | ICD-10-CM

## 2022-12-05 LAB
HOLD SPECIMEN: NORMAL
WHOLE BLOOD HOLD COAG: NORMAL
WHOLE BLOOD HOLD SPECIMEN: NORMAL

## 2022-12-05 PROCEDURE — 99282 EMERGENCY DEPT VISIT SF MDM: CPT

## 2022-12-05 PROCEDURE — 99211 OFF/OP EST MAY X REQ PHY/QHP: CPT

## 2022-12-05 NOTE — ED NOTES
"Pt reports abnormal doppler today 1230 of RLE. Pt states he had right arm sx 2 weeks ago and has been sedentary, was being seen today as post-op follow up and was told to go have doppler done of RLE due to right calf swelling, pt reports positive RLE venous doppler, states \"I have a few blood clots behind my right knee\" pt denies pain c ambulation, no redness or warmth to area noted. Pt denies SOA/CP pt a/o x 4 at this time, spo2 97% RA    PPE per protocol utilized throughout entire patient encounter.     "

## 2022-12-05 NOTE — ED PROVIDER NOTES
EMERGENCY DEPARTMENT ENCOUNTER    Room Number:  32/32  Date of encounter:  12/5/2022  PCP: Osman Head MD  Historian: Patient      HPI:  Chief Complaint: Right calf pain and swelling  A complete HPI/ROS/PMH/PSH/SH/FH are unobtainable due to: None    Context: Jose Manuel Vasquez is a 62 y.o. male who presents to the ED via private vehicle after having a duplex venous ultrasound of his right lower extremity earlier today for increased swelling of his right calf.  Patient is 2 weeks status post left biceps tendon repair, reports chronically having some mild intermittent increased swelling of his right calf due to a previous quadriceps tendon repair 5 years ago.  However, has been much more sedentary since surgery and started to have a little more swelling and discomfort over the last couple days.  Ultrasound showed DVT of the right posterior knee region.  Denies any associated chest pain, shortness of breath, dizziness, lightheadedness.  No prior blood clots in the past.  Not on any anticoagulation.  Denies any heart or kidney issues.      MEDICAL RECORD REVIEW    No medication allergies listed on chart review in epic    PAST MEDICAL HISTORY  Active Ambulatory Problems     Diagnosis Date Noted   • Rupture quadriceps tendon, right, subsequent encounter 10/19/2017   • Tear of medial meniscus of right knee, current 10/23/2017   • Lactose intolerance    • IFG (impaired fasting glucose) 09/10/2018   • Other insomnia 09/10/2018   • Dyslipidemia 10/01/2018   • Family history of colon cancer 09/17/2020   • Asymptomatic varicose veins of both lower extremities 10/06/2020   • Adenomatous polyp of colon 11/10/2022   • Rupture of left biceps tendon 11/10/2022     Resolved Ambulatory Problems     Diagnosis Date Noted   • Internal derangement of knee joint, right 10/19/2017   • Status post tendon repair 11/06/2017   • Tibial tendonitis, posterior, right 09/10/2018   • Screen for colon cancer 09/17/2020   • Screen for colon cancer  09/17/2020     Past Medical History:   Diagnosis Date   • Black-out (not amnesia) 2015   • COVID-19    • Diverticulitis 04/2021   • History of MRSA infection 2013   • Pre-diabetes 2014   • Rupture of quadriceps tendon    • Shingles 01/2020         PAST SURGICAL HISTORY  Past Surgical History:   Procedure Laterality Date   • ARM TENDON REPAIR Left 11/22/2022    Procedure: LEFT DISTAL BICEP TENDON REPAIR;  Surgeon: Donna Cervantes MD;  Location: [facility] MAIN OR;  Service: Orthopedics;  Laterality: Left;   • COLONOSCOPY N/A 12/02/2020    Procedure: COLONOSCOPY TO CECUM WITH HOT SNARE POLYPECTOMY;  Surgeon: Maikol Santizo MD;  Location: [facility] ENDOSCOPY;  Service: General;  Laterality: N/A;  SCREENING  --POLYP   • TX FIX QUAD/HAMSTR MUSC RUPT,PRIMARY Right 10/23/2017    Procedure: QUADRICEPS TENDON REPAIR;  Surgeon: Terrence Lantigua MD;  Location: [facility] [place] [facility];  Service: Orthopedics   • VASECTOMY  3/12/1998         FAMILY HISTORY  Family History   Problem Relation Age of Onset   • COPD Mother    • No Known Problems Father    • Heart disease Brother    • Diabetes Brother    • No Known Problems Maternal Aunt    • No Known Problems Maternal Uncle    • No Known Problems Paternal Aunt    • No Known Problems Paternal Uncle    • No Known Problems Maternal Grandmother    • No Known Problems Maternal Grandfather    • No Known Problems Paternal Grandmother    • No Known Problems Paternal Grandfather    • No Known Problems Brother    • No Known Problems Son    • No Known Problems Daughter    • Anesthesia problems Neg Hx    • Broken bones Neg Hx    • Cancer Neg Hx    • Clotting disorder Neg Hx    • Collagen disease Neg Hx    • Dislocations Neg Hx    • Osteoporosis Neg Hx    • Rheumatologic disease Neg Hx    • Scoliosis Neg Hx    • Severe sprains Neg Hx    • Malig Hyperthermia Neg Hx          SOCIAL HISTORY  Social History     Socioeconomic History   • Marital status:    • Number of children: 2   Tobacco Use   • Smoking  status: Never   • Smokeless tobacco: Never   Vaping Use   • Vaping Use: Never used   Substance and Sexual Activity   • Alcohol use: Yes     Alcohol/week: 6.0 standard drinks     Types: 3 Cans of beer, 3 Shots of liquor per week     Comment: 3-6 drinks/ week   • Drug use: No   • Sexual activity: Yes     Partners: Female     Birth control/protection: Vasectomy     Comment: wife only; no hx STD's         ALLERGIES  Patient has no known allergies.        REVIEW OF SYSTEMS  Review of Systems     All systems reviewed and negative except for those discussed in HPI.       PHYSICAL EXAM    I have reviewed the triage vital signs and nursing notes.    ED Triage Vitals [12/05/22 1351]   Temp Heart Rate Resp BP SpO2   96.9 °F (36.1 °C) 64 18 154/96 97 %      Temp src Heart Rate Source Patient Position BP Location FiO2 (%)   Oral Monitor -- Right arm --       Physical Exam  General: No acute distress, nontoxic  HEENT: Mucous membranes moist, atraumatic, EOMI  Neck: Full ROM  Pulm: Symmetric chest rise, nonlabored, lungs CTAB  Cardiovascular: Regular rate and rhythm, intact distal pulses, slight edema to the right calf as compared to the left   GI: Soft, nontender, nondistended, no rebound, no guarding, bowel sounds present  MSK: Full ROM, no deformity, minimal posterior knee discomfort to palpation  Skin: Warm, dry  Neuro: Awake, alert, oriented x 4, GCS 15, moving all extremities, no focal deficits  Psych: Calm, cooperative      N95, protective eye goggles, and gloves used during this encounter. Patient in surgical mask.      LAB RESULTS  No results found for this or any previous visit (from the past 24 hour(s)).      RADIOLOGY  No Radiology Exams Resulted Within Past 24 Hours      PROCEDURES    Procedures      MEDICATIONS GIVEN IN ER    Medications - No data to display      PROGRESS, DATA ANALYSIS, CONSULTS, AND MEDICAL DECISION MAKING    All labs have been independently reviewed by me.  All radiology studies have been reviewed  by me and discussed with radiologist dictating the report.   EKG's independently viewed and interpreted by me.  Discussion below represents my analysis of pertinent findings related to patient's condition, differential diagnosis, treatment plan and final disposition.    Outpatient imaging showing a right posterior knee DVT, no chest pains or shortness of breath, I do not feel CTA of the chest is indicated at this time.  Recent renal function is reassuring, plan to start on Eliquis with close outpatient PCP follow-up.  All questions and concerns addressed.  ED return for worsening symptoms as needed.         AS OF 19:14 EST VITALS:    BP - 154/96  HR - 64  TEMP - 96.9 °F (36.1 °C) (Oral)  02 SATS - 97%        DIAGNOSIS  Final diagnoses:   Acute deep vein thrombosis (DVT) of calf muscle vein of right lower extremity (HCC)         DISPOSITION  DISCHARGE    Patient discharged in stable condition.    Reviewed implications of results, diagnosis, meds, responsibility to follow up, warning signs and symptoms of possible worsening, potential complications and reasons to return to ER. If your blood pressure > 120/80 please follow up with your primary care provider for further management.    Patient/Family voiced understanding of above instructions.    Discussed plan for discharge, as there is no emergent indication for admission. Pt/family is agreeable and understands need for follow up and repeat testing.  Pt is aware that discharge does not mean that nothing is wrong but it indicates no emergency is present that requires admission and they must continue care with follow-up as given below or physician of their choice.     FOLLOW-UP  [facility] Emergency Department  [address] [facility] [address] [phone]    As needed, If symptoms worsen    Osman Head MD  [address]  [place] [id number] [phone]    Schedule an appointment as soon as possible for a visit             Medication List      New Prescriptions    Apixaban Starter Pack tablet therapy pack  Take two 5 mg tablets by mouth every 12 hours for 7 days. Followed by one 5 mg tablet every 12 hours. (Dispense starter pack if available)           Where to Get Your Medications      You can get these medications from any pharmacy    Bring a paper prescription for each of these medications  · Apixaban Starter Pack tablet therapy pack                      Yousif Smith MD  12/06/22 0015

## 2022-12-05 NOTE — ED TRIAGE NOTES
Pt presents to ED after being told to come for positive RLE venous duplex. Pt had left arm surgery 2 weeks ago and developed pain and swelling behind his right knee since last night.

## 2022-12-06 NOTE — PROGRESS NOTES
Psychiatric Clinical Pharmacy Services: Pharmacy Education - Direct Oral Anticoagulant - Eliquis Starter Pack     Jose Manuel Vasquez has been ordered Eliquis Starter Pack for deep vein thrombosis.     Counseling points included the followin. Eliquis's indication, patient's need for the medication, and dosing/frequency of this medication.  2. Enforced the importance of taking their medication as instructed every day and the reason why the medication is dosed that way.  3. Explained possible side effects of anticoagulation therapy, including increased risk of bleeding, and s/sx of bleeding. Also talked about ways to control bleeding for minor cuts and scrapes.  4. Emphasized the importance of going to the emergency room if any of the following occur: Falling and hitting your head; noticing bright red blood in urine or dark/tarry stools; vomiting up blood or vomit has a coffee-ground like texture; coughing up blood.  5. Discussed the importance of informing any physician or dentist that they have been started on a DOAC, in case they need to be taken off for a procedure.  6. Discussed all important drug interactions, including over-the-counter medications and supplements.  7. Instructed the patient not to begin or discontinue any medications without informing his/her physician/pharmacist.     I also explained to the patient that this medication may have a high copay associated with it and to let the provider know if it is unaffordable.     Patient expressed understanding and had no further questions.      Carlie Hunter, Pharmacy Intern  Clinical Pharmacist

## 2022-12-06 NOTE — DISCHARGE INSTRUCTIONS
Follow-up with your physician for management of the Eliquis, continue all other current medications.  ED return for worsening symptoms as needed.

## 2023-08-31 ENCOUNTER — TELEPHONE (OUTPATIENT)
Dept: INTERNAL MEDICINE | Facility: CLINIC | Age: 63
End: 2023-08-31

## 2023-08-31 ENCOUNTER — OFFICE VISIT (OUTPATIENT)
Dept: INTERNAL MEDICINE | Facility: CLINIC | Age: 63
End: 2023-08-31
Payer: COMMERCIAL

## 2023-08-31 VITALS
TEMPERATURE: 98.5 F | HEART RATE: 60 BPM | OXYGEN SATURATION: 98 % | SYSTOLIC BLOOD PRESSURE: 144 MMHG | BODY MASS INDEX: 32.78 KG/M2 | DIASTOLIC BLOOD PRESSURE: 86 MMHG | WEIGHT: 221.3 LBS | HEIGHT: 69 IN

## 2023-08-31 DIAGNOSIS — R10.32 ABDOMINAL PAIN, LLQ: Primary | ICD-10-CM

## 2023-08-31 DIAGNOSIS — K57.90 DIVERTICULOSIS: ICD-10-CM

## 2023-08-31 DIAGNOSIS — D12.6 ADENOMATOUS POLYP OF COLON, UNSPECIFIED PART OF COLON: ICD-10-CM

## 2023-08-31 LAB
BILIRUB BLD-MCNC: NEGATIVE MG/DL
CLARITY, POC: CLEAR
COLOR UR: YELLOW
EXPIRATION DATE: ABNORMAL
GLUCOSE UR STRIP-MCNC: NEGATIVE MG/DL
HCT VFR BLDA CALC: 46.2 % (ref 38–51)
HGB BLDA-MCNC: 14.9 G/DL (ref 12–17)
KETONES UR QL: NEGATIVE
LEUKOCYTE EST, POC: NEGATIVE
LYMPHOCYTES # BLD: 32.8 %
Lab: ABNORMAL
MCH, POC: 29.4
MCHC, POC: 32.3
MCV, POC: 91.3
MONOCYTES # BLD: 10.2 %
NITRITE UR-MCNC: NEGATIVE MG/ML
PH UR: 6 [PH] (ref 5–8)
PLATELET # BLD: 241 10*3/MM3
PMV BLD: 8.4 FL
POC NEUTROPHIL: 57 %
PROT UR STRIP-MCNC: NEGATIVE MG/DL
RBC # UR STRIP: ABNORMAL /UL
RBC, POC: 5.06
RDW, POC: 46.2
SP GR UR: 1.02 (ref 1–1.03)
UROBILINOGEN UR QL: ABNORMAL
WBC # BLD: 8.7 10*3/UL

## 2023-08-31 PROCEDURE — 85025 COMPLETE CBC W/AUTO DIFF WBC: CPT | Performed by: INTERNAL MEDICINE

## 2023-08-31 PROCEDURE — 81003 URINALYSIS AUTO W/O SCOPE: CPT | Performed by: INTERNAL MEDICINE

## 2023-08-31 PROCEDURE — 99214 OFFICE O/P EST MOD 30 MIN: CPT | Performed by: INTERNAL MEDICINE

## 2023-08-31 RX ORDER — APIXABAN 5 MG/1
1 TABLET, FILM COATED ORAL EVERY 12 HOURS SCHEDULED
COMMUNITY
Start: 2023-08-08

## 2023-08-31 NOTE — TELEPHONE ENCOUNTER
Caller: Jose Manuel Vasquez    Relationship: Self    Best call back number: [phone]    What medication are you requesting: ANTIBIOTIC    What are your current symptoms: DIVERTICULITIS FLAIR UP    How long have you been experiencing symptoms: MONDAY AFTERNOON     Have you had these symptoms before:    [x] Yes  [] No    Have you been treated for these symptoms before:   [x] Yes  [] No    If a prescription is needed, what is your preferred pharmacy and phone number: [id number] [id number] [facility] AT [facility] [address] - [phone] [facility] [phone] FX     Additional notes:

## 2023-08-31 NOTE — PROGRESS NOTES
"Diverticulitis (Flare up)      HPI  Jose Manuel Vasquez is a 63 y.o. male RTC in acute care:   \"I think I have another diverticulitis attack'. WOke up Monday (4 days ago) with fatigue and 'lethargic'. DId not go to gym as felt that bed.  Started with pain in 'lower left' started same day.  \"Thought had stomach bug'.  Felt better next day in AM, but them in middle of night woke with pain in 'lower left side' of abdomen. Since then has been dealing with pain consistently 'for the last 2 days'. No fevers.  Bowels are 'a little loose, they always are'.  No blood in stool.    Last few nights with pain flare in middle of night will have BM and 'get some relief'.  Those are 'loose', that 'is typical'.        Review of Systems   Constitutional: Positive for malaise/fatigue (improved after Monday). Negative for chills and fever.   HENT:  Negative for congestion and sore throat.    Cardiovascular:  Negative for dyspnea on exertion.   Respiratory:  Negative for cough and shortness of breath.    Skin:  Negative for rash and suspicious lesions.   Gastrointestinal:  Positive for abdominal pain and change in bowel habit. Negative for constipation, diarrhea, hematochezia, melena, nausea and vomiting.   Genitourinary:  Positive for frequency (a bit more this week). Negative for dysuria and hematuria.     The following portions of the patient's history were reviewed and updated as appropriate: allergies, current medications, past medical history, past social history, and problem list.      Current Outpatient Medications:     Acetylcysteine (NAC) capsule capsule, Take  by mouth., Disp: , Rfl:     Ascorbic Acid (VITAMIN C PO), Take  by mouth., Disp: , Rfl:     Eliquis 5 MG tablet tablet, Take 1 tablet by mouth Every 12 (Twelve) Hours., Disp: , Rfl:     Zinc Sulfate (ZINC-220 PO), Take  by mouth., Disp: , Rfl:     Vitals:    08/31/23 1455   BP: 144/86   BP Location: Left arm   Patient Position: Sitting   Cuff Size: Adult   Pulse: 60   Temp: " "98.5 øF (36.9 øC)   TempSrc: Oral   SpO2: 98%   Weight: 100 kg (221 lb 4.8 oz)   Height: 175.3 cm (69\")     Body mass index is 32.68 kg/mý.      Physical Exam  Vitals reviewed.   Constitutional:       General: He is not in acute distress.     Appearance: He is well-developed. He is not ill-appearing or toxic-appearing.   HENT:      Head: Normocephalic and atraumatic.      Mouth/Throat:      Mouth: Mucous membranes are moist. No oral lesions.      Tongue: No lesions.      Pharynx: Oropharynx is clear. No pharyngeal swelling, oropharyngeal exudate, posterior oropharyngeal erythema or uvula swelling.   Eyes:      General: No scleral icterus.     Conjunctiva/sclera: Conjunctivae normal.      Pupils: Pupils are equal, round, and reactive to light.   Neck:      Vascular: No carotid bruit.   Cardiovascular:      Rate and Rhythm: Normal rate and regular rhythm.      Pulses:           Carotid pulses are 2+ on the right side and 2+ on the left side.       Radial pulses are 2+ on the right side and 2+ on the left side.      Heart sounds: Normal heart sounds.   Pulmonary:      Effort: Pulmonary effort is normal. No respiratory distress.      Breath sounds: Normal breath sounds. No wheezing, rhonchi or rales.   Abdominal:      General: Bowel sounds are normal. There is no distension.      Palpations: Abdomen is soft. There is no mass.      Tenderness: There is no abdominal tenderness. There is no right CVA tenderness, left CVA tenderness, guarding or rebound.   Genitourinary:     Prostate: Not tender and no nodules present.      Rectum: No tenderness. Normal anal tone.   Musculoskeletal:      Cervical back: Normal range of motion and neck supple. No muscular tenderness.   Lymphadenopathy:      Cervical: No cervical adenopathy.   Skin:     Findings: No rash (over back/ torso on L).   Neurological:      Mental Status: He is alert and oriented to person, place, and time.      Cranial Nerves: No cranial nerve deficit.      Gait: " Gait normal.   Psychiatric:         Attention and Perception: Attention normal.         Mood and Affect: Mood and affect normal.         Behavior: Behavior normal.         Thought Content: Thought content normal.       Results for orders placed or performed in visit on 08/31/23   POC Urinalysis Dipstick, Automated    Specimen: Urine   Result Value Ref Range    Color Yellow Yellow, Straw, Dark Yellow, Liza    Clarity, UA Clear Clear    Specific Gravity  1.020 1.005 - 1.030    pH, Urine 6.0 5.0 - 8.0    Leukocytes Negative Negative    Nitrite, UA Negative Negative    Protein, POC Negative Negative mg/dL    Glucose, UA Negative Negative mg/dL    Ketones, UA Negative Negative    Urobilinogen, UA 0.2 E.U./dL Normal, 0.2 E.U./dL    Bilirubin Negative Negative    Blood, UA Trace (A) Negative    Lot Number [id number]     Expiration Date 7,312,024    POC CBC With / Auto Diff    Specimen: Blood   Result Value Ref Range    WBC 8.7     RBC 5.06     Hemoglobin 14.9 12.0 - 17.0 g/dL    Hematocrit 46.2 38 - 51 %    MCV 91.3     MCH 29.4     MCHC 32.3     RDW-CV 46.2     MPV 8.4     Platelets 241 10*3/mm3    Neutrophil Rel % 57.0 %    Monocyte Rel % 10.2 %    Lymphocyte Rel % 32.8 %       Assessment/ Plan  Diagnoses and all orders for this visit:    Abdominal pain, LLQ  -     POC Urinalysis Dipstick, Automated  -     POC CBC With / Auto Diff  -     Comprehensive Metabolic Panel  -     CT Abdomen Pelvis With Contrast; Future    Diverticulosis  -     CT Abdomen Pelvis With Contrast; Future    Adenomatous polyp of colon, unspecified part of colon  -     CT Abdomen Pelvis With Contrast; Future    Other orders  -     Eliquis 5 MG tablet tablet; Take 1 tablet by mouth Every 12 (Twelve) Hours.        Return for Next scheduled follow up.      Discussion:  Jose Manuel Vasquez is a 63 y.o. male with hx empiric dx/ tx of diverticulitis in 4/2021 and acute DVT on R in 12/2022 when was 2 weeks status post left biceps tendon repair RTC in acute care  (new issue to examiner) with 4 days of LLQ abdominal pain waking at night with stool urgency, no blood. Exam today is equivocal and unable to reproduce LLQ pain.  Prostate non-tender.  Blood counts OK in office, equivocal trace blood on Udip. D/W pt, given recurrence of sx from 2021, do think it is important to clarify dx both for tx and for future prognosis discussions.   - CT A/P with contrast   - CMP pending.   - Monona diet, may consider moving to liquids only based on sx.   - Address Eliquis use on f/u with pt after CT, noting he is 8 months into tx from ED in 12/2022 (not seen here since) on chart review after visit completed.  - F/U after above.

## 2023-09-01 ENCOUNTER — HOSPITAL ENCOUNTER (OUTPATIENT)
Dept: CT IMAGING | Facility: HOSPITAL | Age: 63
Discharge: HOME | End: 2023-09-01
Admitting: INTERNAL MEDICINE
Payer: COMMERCIAL

## 2023-09-01 DIAGNOSIS — R10.32 ABDOMINAL PAIN, LLQ: ICD-10-CM

## 2023-09-01 DIAGNOSIS — K57.90 DIVERTICULOSIS: ICD-10-CM

## 2023-09-01 DIAGNOSIS — D12.6 ADENOMATOUS POLYP OF COLON, UNSPECIFIED PART OF COLON: ICD-10-CM

## 2023-09-01 LAB
ALBUMIN SERPL-MCNC: 4.9 G/DL (ref 3.5–5.2)
ALBUMIN/GLOB SERPL: 1.9 G/DL
ALP SERPL-CCNC: 78 U/L (ref 39–117)
ALT SERPL-CCNC: 20 U/L (ref 1–41)
AST SERPL-CCNC: 25 U/L (ref 1–40)
BILIRUB SERPL-MCNC: 1.4 MG/DL (ref 0–1.2)
BUN SERPL-MCNC: 19 MG/DL (ref 8–23)
BUN/CREAT SERPL: 13.5 (ref 7–25)
CALCIUM SERPL-MCNC: 9.8 MG/DL (ref 8.6–10.5)
CHLORIDE SERPL-SCNC: 101 MMOL/L (ref 98–107)
CO2 SERPL-SCNC: 26.3 MMOL/L (ref 22–29)
CREAT BLDA-MCNC: 1.2 MG/DL (ref 0.6–1.3)
CREAT SERPL-MCNC: 1.41 MG/DL (ref 0.76–1.27)
EGFRCR SERPLBLD CKD-EPI 2021: 56.7 ML/MIN/1.73
GLOBULIN SER CALC-MCNC: 2.6 GM/DL
GLUCOSE SERPL-MCNC: 97 MG/DL (ref 65–99)
POTASSIUM SERPL-SCNC: 4.2 MMOL/L (ref 3.5–5.2)
PROT SERPL-MCNC: 7.5 G/DL (ref 6–8.5)
SODIUM SERPL-SCNC: 138 MMOL/L (ref 136–145)

## 2023-09-01 PROCEDURE — 25510000001 IOPAMIDOL 61 % SOLUTION: Performed by: INTERNAL MEDICINE

## 2023-09-01 PROCEDURE — 82565 ASSAY OF CREATININE: CPT

## 2023-09-01 PROCEDURE — 74177 CT ABD & PELVIS W/CONTRAST: CPT

## 2023-09-01 RX ADMIN — IOPAMIDOL 85 ML: 612 INJECTION, SOLUTION INTRAVENOUS at 08:37

## 2023-09-01 NOTE — NURSING NOTE
Per call to patient by Dr. Head OK for patient to leave; IV removed and pt directed to main lobby for exit.

## 2023-09-05 ENCOUNTER — TELEPHONE (OUTPATIENT)
Dept: INTERNAL MEDICINE | Facility: CLINIC | Age: 63
End: 2023-09-05
Payer: COMMERCIAL

## 2023-09-05 NOTE — TELEPHONE ENCOUNTER
Notes 'pain is gone' and 'energy is improving'. Feeling better overall.     Pt notes that started with keffir and probiotic water day of CT scan. Over weekend sx really improved.  Able to sleep through night last 3 nights.    Added fiber and 'really cleaned out this AM'.   Is motivated to work this 'case of the bad gut'.     Advised taper up fiber by 1/4 dose each week to full dose.   C/W probiotic daily  Ok for yogurt as bedtime snack    Call if pain returns or fails to continue to improve.

## 2024-01-05 ENCOUNTER — OFFICE VISIT (OUTPATIENT)
Dept: INTERNAL MEDICINE | Facility: CLINIC | Age: 64
End: 2024-01-05
Payer: COMMERCIAL

## 2024-01-05 VITALS
SYSTOLIC BLOOD PRESSURE: 130 MMHG | OXYGEN SATURATION: 97 % | BODY MASS INDEX: 33.62 KG/M2 | TEMPERATURE: 98.1 F | HEART RATE: 65 BPM | WEIGHT: 227 LBS | HEIGHT: 69 IN | DIASTOLIC BLOOD PRESSURE: 82 MMHG

## 2024-01-05 DIAGNOSIS — Z87.09 HISTORY OF BRONCHITIS: ICD-10-CM

## 2024-01-05 DIAGNOSIS — K64.5 EXTERNAL HEMORRHOID, THROMBOSED: Primary | ICD-10-CM

## 2024-01-05 PROCEDURE — 99214 OFFICE O/P EST MOD 30 MIN: CPT | Performed by: INTERNAL MEDICINE

## 2024-01-05 RX ORDER — PRAMOXINE HYDROCHLORIDE 10 MG/G
AEROSOL, FOAM TOPICAL
Qty: 15 G | Refills: 1 | Status: SHIPPED | OUTPATIENT
Start: 2024-01-05

## 2024-01-05 NOTE — PROGRESS NOTES
Cough and Hemorrhoids      HPI  Jose Manuel Vasquez is a 63 y.o. male RTC In acute care:   Notes that grandchild, who wife and pt watch, had RSV over Chirstmas season.  Pt started with sx 12/23/23 and had 'terrible sx' with lots of cough and congestion.   Is getting better overall. 'I feel great'.  Miniaml cough now, 'almost non-existent'.  However, 7 days ago noted hemorrhoid with 'uncomfortable down there'.  Looked in mirror and has 'hemorrhoid here going'.  Is hard to sit and struggles to sit with discomfort. Did drive back 12 hours from [place] over week and did not help.  No bleeding. Mostly pain, 'very sensitive'.    Using Prep-H and is not better.  No other tx tried.   Pain with wiping after BM.  Shower that is hot will help, no sitz bath.   Bowels are 'good, I am good'.  Notes last visit has constiaption issues. Is on fiber daily with probiotic daily and 'that has been fine'.  Having 2 BM's daily, no straining 'at all'.       Review of Systems   Constitutional: Negative for chills and fever.   HENT:  Negative for congestion and sore throat.    Respiratory:  Positive for cough (minimal, largely resolved).    Gastrointestinal:  Positive for change in bowel habit (improved). Negative for constipation (resolved, controlled).       The following portions of the patient's history were reviewed and updated as appropriate: allergies, current medications, past medical history, past social history, and problem list.      Current Outpatient Medications:     Acetylcysteine (NAC) capsule capsule, Take  by mouth., Disp: , Rfl:     Ascorbic Acid (VITAMIN C PO), Take  by mouth., Disp: , Rfl:     Eliquis 5 MG tablet tablet, Take 1 tablet by mouth Every 12 (Twelve) Hours., Disp: , Rfl:     Zinc Sulfate (ZINC-220 PO), Take  by mouth., Disp: , Rfl:     Pramoxine HCl, Perianal, 1 % foam, Insert  into the rectum Every 2 (Two) Hours As Needed for Hemorrhoids., Disp: 15 g, Rfl: 1    Vitals:    01/05/24 1101   BP: 130/82   BP Location: Left  "arm   Patient Position: Sitting   Cuff Size: Large Adult   Pulse: 65   Temp: 98.1 °F (36.7 °C)   TempSrc: Infrared   SpO2: 97%   Weight: 103 kg (227 lb)   Height: 175.3 cm (69.02\")     Body mass index is 33.51 kg/m².      Physical Exam  Vitals reviewed.   Constitutional:       General: He is not in acute distress.     Appearance: He is well-developed. He is not ill-appearing or toxic-appearing.   HENT:      Head: Normocephalic.   Pulmonary:      Effort: Pulmonary effort is normal. No respiratory distress.   Genitourinary:     Rectum: Tenderness (at external hemorrhoid) and external hemorrhoid (large, ~1.5cm, thrombosed) present. No anal fissure. Normal anal tone.   Skin:     Findings: No rash (archie-anal).   Neurological:      Mental Status: He is alert and oriented to person, place, and time.   Psychiatric:         Behavior: Behavior normal.         Thought Content: Thought content normal.         Assessment/ Plan  Diagnoses and all orders for this visit:    External hemorrhoid, thrombosed  -     Pramoxine HCl, Perianal, 1 % foam; Insert  into the rectum Every 2 (Two) Hours As Needed for Hemorrhoids.  -     Ambulatory Referral to Colorectal Surgery    History of bronchitis        Return for Next scheduled follow up.      Discussion:  Jose Manuel Vasquez is a 63 y.o. male with hx of empiric dx/ tx of diverticulitis in 4/2021 and hx in 8/2023 of LLQ abdominal pain waking at night with stool urgency resolved with constipation mgmnt/ (-) CT A/P RTC In acute care (new issue to examiner) with recent presumed RSV URI and development of large external thrombosed hemorrhoid.  So far, has been resistent to warm showers and PrepH OTC with significant level of pain.  D/W pt that given large size and persitence, suspect will need intervention with colorectal surgery.  Referral and direct message placed to office.  In meantime, advised Sitz baths 3-4x/ daily, trial of pramoxine foam, c/w PrepH steroid topical, and c/w bowel regimen to " avoid constipation.

## 2024-01-05 NOTE — Clinical Note
Elo, this is pt I messaged you about.  Any help getting him in for this very large thrombosed 'roid would be helpful. Thank you! HAMILTON

## 2024-01-09 ENCOUNTER — OFFICE VISIT (OUTPATIENT)
Dept: SURGERY | Facility: CLINIC | Age: 64
End: 2024-01-09
Payer: COMMERCIAL

## 2024-01-09 VITALS
HEART RATE: 64 BPM | DIASTOLIC BLOOD PRESSURE: 92 MMHG | WEIGHT: 226.1 LBS | BODY MASS INDEX: 33.49 KG/M2 | HEIGHT: 69 IN | SYSTOLIC BLOOD PRESSURE: 138 MMHG | OXYGEN SATURATION: 96 %

## 2024-01-09 DIAGNOSIS — K64.4 EXTERNAL HEMORRHOID: Primary | ICD-10-CM

## 2024-01-09 PROCEDURE — 99203 OFFICE O/P NEW LOW 30 MIN: CPT | Performed by: PHYSICIAN ASSISTANT

## 2024-01-09 RX ORDER — HYDROCORTISONE 25 MG/G
CREAM TOPICAL
Qty: 30 G | Refills: 1 | Status: SHIPPED | OUTPATIENT
Start: 2024-01-09

## 2024-01-09 RX ORDER — HYDROCORTISONE ACETATE AND PRAMOXINE HYDROCHLORIDE 10; 10 MG/G; MG/G
1 CREAM TOPICAL 3 TIMES DAILY
COMMUNITY
Start: 2024-01-05

## 2024-01-09 NOTE — PROGRESS NOTES
Jose Manuel Vasquez is a 63 y.o. male who is seen as a consult at the request of No ref. provider found for Rectal Bleeding.      HPI:    Pt presents today for evaluation for left-sided perianal swelling, which he contributes to a thrombosed hemorrhoid.   Dx with RSV and had a persistent cough around [place] time.   Developed perianal swelling and pain approximately 10-11 days ago.  Was in [place] and drove back, which made symptoms worse.     Applied PrepH x1 week, but did not find this helpful.   Taking sitz baths and using a donut pillow with some symptomatic improvement.   Was evaluated by his PCP 01/05/2024 and provided with an Rx for Pramoxine HCL 1% foam, but was unable to  from his pharmacy as they were out.     Pt states he typically has a BM daily in the mornings.  [place]: 5-6  No straining  Takes Metamucil and a probiotic every other day.  No stool softeners or laxatives    Does weight-lift as part of his exercise regimen, but has not experienced hemorrhoid flare-ups to this degree before.     Hx of DVT in 2022 and chronically anticoagulated with Eliquis.   States he has not taken this x2 days in anticipation of a procedure being performed today.     Had a procedure performed around the anus in the 1990's.  Believes he had a polyp removed.     Last colonoscopy 12/02/2020:  TA x1 within the rectum.  Recall 5 years.    Maternal aunt with Hx of colon cancer in her 50's.   Denies any FHx of colon polyps, colon cancer, or IBD in any first degree relatives.       Past Medical History:   Diagnosis Date    Black-out (not amnesia) 2015    CARDIAC WORK UP OK, NO PROBLEMS SINCE    COVID-19     x 2    Deep vein thrombosis December 2022    taking eliquis    Diverticulitis 04/2021    presumptive dx wtih empiric tx    Fissure, anal 1990's    History of MRSA infection 2013    SKIN GROIN, ARMPIT     Lactose intolerance     manages with avoidance    Pre-diabetes 2014    managed with diet and exercise.     Rupture of  quadriceps tendon     Shingles 01/2020    Tibial tendonitis, posterior, right 09/10/2018    2018, boot --> ankle brace       Past Surgical History:   Procedure Laterality Date    ARM TENDON REPAIR Left 11/22/2022    Procedure: LEFT DISTAL BICEP TENDON REPAIR;  Surgeon: Donna Cervantes MD;  Location: [facility] OR;  Service: Orthopedics;  Laterality: Left;    COLONOSCOPY N/A 12/02/2020    Procedure: COLONOSCOPY TO CECUM WITH HOT SNARE POLYPECTOMY;  Surgeon: Maikol Santizo MD;  Location: [facility] ENDOSCOPY;  Service: General;  Laterality: N/A;  SCREENING  --POLYP    NC SUTURE QUADRICEPS/HAMSTRING RUPTURE PRIMARY Right 10/23/2017    Procedure: QUADRICEPS TENDON REPAIR;  Surgeon: Terrence Lantigua MD;  Location: [facility] OR [facility] [place];  Service: Orthopedics    VASECTOMY  3/12/1998       Social History:   reports that he has never smoked. He has never been exposed to tobacco smoke. He has never used smokeless tobacco. He reports current alcohol use of about 6.0 standard drinks of alcohol per week. He reports that he does not use drugs.      Marriage status:     Family History   Problem Relation Age of Onset    COPD Mother     No Known Problems Father     Heart disease Brother     Diabetes Brother     No Known Problems Maternal Aunt     No Known Problems Maternal Uncle     No Known Problems Paternal Aunt     No Known Problems Paternal Uncle     No Known Problems Maternal Grandmother     No Known Problems Maternal Grandfather     No Known Problems Paternal Grandmother     No Known Problems Paternal Grandfather     No Known Problems Brother     No Known Problems Son     No Known Problems Daughter     Anesthesia problems Neg Hx     Broken bones Neg Hx     Cancer Neg Hx     Clotting disorder Neg Hx     Collagen disease Neg Hx     Dislocations Neg Hx     Osteoporosis Neg Hx     Rheumatologic disease Neg Hx     Scoliosis Neg Hx     Severe sprains Neg Hx     Malig Hyperthermia Neg Hx          Current Outpatient Medications:      Acetylcysteine (NAC) capsule capsule, Take  by mouth., Disp: , Rfl:     Ascorbic Acid (VITAMIN C PO), Take  by mouth., Disp: , Rfl:     Eliquis 5 MG tablet tablet, Take 1 tablet by mouth Every 12 (Twelve) Hours., Disp: , Rfl:     Pramosone 1-1 % cream, Apply 1 application  topically to the appropriate area as directed 3 (Three) Times a Day., Disp: , Rfl:     Zinc Sulfate (ZINC-220 PO), Take  by mouth., Disp: , Rfl:     Hydrocortisone, Perianal, (Anusol-HC) 2.5 % rectal cream, Apply rectally 3 times daily.  Include applicator., Disp: 30 g, Rfl: 1    Pramoxine HCl, Perianal, 1 % foam, Insert  into the rectum Every 2 (Two) Hours As Needed for Hemorrhoids. (Patient not taking: Reported on 1/9/2024), Disp: 15 g, Rfl: 1    Allergy  Patient has no known allergies.    Review of Systems   Constitutional: Negative for decreased appetite and weight gain.   HENT:  Negative for congestion, hearing loss and hoarse voice.    Eyes:  Negative for blurred vision, discharge and visual disturbance.   Cardiovascular:  Negative for chest pain, cyanosis and leg swelling.   Respiratory:  Negative for cough, shortness of breath, sleep disturbances due to breathing and snoring.    Endocrine: Negative for cold intolerance and heat intolerance.   Hematologic/Lymphatic: Does not bruise/bleed easily.   Skin:  Negative for itching, poor wound healing and skin cancer.   Musculoskeletal:  Negative for arthritis, back pain, joint pain and joint swelling.   Gastrointestinal:  Negative for abdominal pain, change in bowel habit, bowel incontinence and constipation.   Genitourinary:  Negative for bladder incontinence, dysuria and hematuria.   Neurological:  Negative for brief paralysis, excessive daytime sleepiness, dizziness, focal weakness, headaches, light-headedness and weakness.   Psychiatric/Behavioral:  Negative for altered mental status and hallucinations. The patient does not have insomnia.    Allergic/Immunologic: Negative for HIV exposure and  persistent infections.   All other systems reviewed and are negative.      Vitals:    01/09/24 0954   BP: 138/92   Pulse: 64   SpO2: 96%     Body mass index is 33.39 kg/m².    Physical Exam  Exam conducted with a chaperone present.   Constitutional:       General: He is not in acute distress.     Appearance: He is well-developed.   HENT:      Head: Normocephalic and atraumatic.      Nose: Nose normal.   Eyes:      Conjunctiva/sclera: Conjunctivae normal.      Pupils: Pupils are equal, round, and reactive to light.   Neck:      Trachea: No tracheal deviation.   Pulmonary:      Effort: Pulmonary effort is normal. No respiratory distress.      Breath sounds: Normal breath sounds.   Abdominal:      General: Bowel sounds are normal. There is no distension.      Palpations: Abdomen is soft.   Genitourinary:     Comments: Perianal exam: Enlarged hemorrhoid in the left lateral position. Soft, non-thrombosed. No active bleeding. QUANG and anoscopy deferred due to Pt discomfort.   Musculoskeletal:         General: No deformity. Normal range of motion.      Cervical back: Normal range of motion.   Skin:     General: Skin is warm and dry.   Neurological:      Mental Status: He is alert and oriented to person, place, and time.      Cranial Nerves: No cranial nerve deficit.      Coordination: Coordination normal.      Gait: Gait normal.   Psychiatric:         Behavior: Behavior normal.         Judgment: Judgment normal.         Review of Medical Record:  I reviewed medical records as detailed in HPI.     Colonoscopy 12/02/2020:  - <1 cm Tubular Adenoma, Rectum  - Recall: 5 years  - Dr. Maikol Santizo, [facility]    Assessment:  1. External hemorrhoid    - New    Plan:  - Discussed physical exam findings with Pt and recommended continuation of conservative management.   - Rx for Anusol-HC 2.5% Rectal Cream Provided. Apply TID x7-10 Days. Cautioned against chronic use.   - Recommended OTC Recticare PRN for perianal discomfort  - Alternate  OTC Tylenol and Ibuprofen as needed  - Continue sitz baths and use of donut pillow  - Advised against lifting more than 15 lbs until he achieves symptomatic improvement.   - Follow up as needed for any new or worsening symptoms.

## 2024-01-09 NOTE — LETTER
January 9, 2024     Patient: Jose Manuel Vasquez   YOB: 1960   Date of Visit: 1/9/2024       To Whom It May Concern:    It is my medical opinion that Jose Manuel Vasquez should remain off work until 01/17/2024.            Sincerely,        Lisa Grijalva PA-C
